# Patient Record
Sex: FEMALE | Race: WHITE | NOT HISPANIC OR LATINO | Employment: OTHER | URBAN - METROPOLITAN AREA
[De-identification: names, ages, dates, MRNs, and addresses within clinical notes are randomized per-mention and may not be internally consistent; named-entity substitution may affect disease eponyms.]

---

## 2017-10-30 ENCOUNTER — TRANSCRIBE ORDERS (OUTPATIENT)
Dept: ADMINISTRATIVE | Facility: HOSPITAL | Age: 72
End: 2017-10-30

## 2017-10-30 DIAGNOSIS — Z12.31 VISIT FOR SCREENING MAMMOGRAM: Primary | ICD-10-CM

## 2017-11-06 DIAGNOSIS — M85.80 OTHER SPECIFIED DISORDERS OF BONE DENSITY AND STRUCTURE, UNSPECIFIED SITE (CODE): ICD-10-CM

## 2017-11-06 DIAGNOSIS — Z12.31 ENCOUNTER FOR SCREENING MAMMOGRAM FOR MALIGNANT NEOPLASM OF BREAST: ICD-10-CM

## 2017-11-22 ENCOUNTER — ALLSCRIPTS OFFICE VISIT (OUTPATIENT)
Dept: OTHER | Facility: OTHER | Age: 72
End: 2017-11-22

## 2017-11-22 PROCEDURE — G0145 SCR C/V CYTO,THINLAYER,RESCR: HCPCS | Performed by: OBSTETRICS & GYNECOLOGY

## 2017-11-24 ENCOUNTER — LAB REQUISITION (OUTPATIENT)
Dept: LAB | Facility: HOSPITAL | Age: 72
End: 2017-11-24
Payer: MEDICARE

## 2017-11-24 DIAGNOSIS — Z01.419 ENCOUNTER FOR GYNECOLOGICAL EXAMINATION WITHOUT ABNORMAL FINDING: ICD-10-CM

## 2017-11-30 LAB
LAB AP GYN PRIMARY INTERPRETATION: NORMAL
Lab: NORMAL

## 2018-01-08 ENCOUNTER — HOSPITAL ENCOUNTER (OUTPATIENT)
Dept: MAMMOGRAPHY | Facility: MEDICAL CENTER | Age: 73
Discharge: HOME | End: 2018-01-08
Payer: MEDICARE

## 2018-01-08 DIAGNOSIS — Z12.31 ENCOUNTER FOR SCREENING MAMMOGRAM FOR MALIGNANT NEOPLASM OF BREAST: ICD-10-CM

## 2018-01-08 PROCEDURE — 77067 SCR MAMMO BI INCL CAD: CPT

## 2018-01-13 NOTE — MISCELLANEOUS
Message  Pt called has restarted on the premarin cream after not using for several years due to being bedridden, She wants to change to the Estrace was given a cheaper price then when she gets relief wants to try the OTC natural version which is fine with Dr Chelle Ramsey   Rx caleld in for her      Active Problems    1  Asymptomatic menopausal state (V49 81) (Z78 0)   2  Atrophic vaginitis (627 3) (N95 2)   3  Encounter for routine gynecological examination with Papanicolaou smear of cervix   (V72 31,V76 2) (Z01 419)   4  Encounter for screening mammogram for malignant neoplasm of breast (V76 12)   (Z12 31)   5  Osteopenia (733 90) (M85 80)   6  Vulvovaginitis candida albicans (112 1) (B37 3)    Current Meds   1  Calcium TABS; Therapy: (Recorded:14Mar2013) to Recorded   2  Estrace 0 1 MG/GM Vaginal Cream; 2gm vaginall once a week; Therapy: 84MEW3015 to (Last Rx:[id number])  Requested for: 37XHX0751 Ordered   3  Fish Oil CAPS; Therapy: (Recorded:08Mar2013) to Recorded   4  Flaxseed Oil CAPS; Therapy: (Recorded:14Mar2013) to Recorded   5  Glucosamine TABS; Therapy: [phone]) to Recorded   6  Losartan Potassium-HCTZ 50-12 5 MG Oral Tablet; Therapy: (Recorded:14Mar2013) to Recorded   7  Lutein 20 MG Oral Tablet; Therapy: [phone]) to Recorded   8  Magnesium 250 MG Oral Tablet; Therapy: [phone]) to Recorded   9  Premarin 0 625 MG/GM Vaginal Cream; Insert 1/2 applicator 3 x's week @ hs x 3wks  ,   then once weekly thereafter; Therapy: 60OFG6726 to (Evaluate:05Jan2016)  Requested for: 66EMM9902; Last   Rx:07Oct2015 Ordered    Allergies    1   Penicillins    Signatures   Electronically signed by : Angelica Snellen, ; May 12 2016  2:06PM EST                       (Author)

## 2018-01-22 VITALS
BODY MASS INDEX: 25.01 KG/M2 | DIASTOLIC BLOOD PRESSURE: 76 MMHG | HEIGHT: 68 IN | SYSTOLIC BLOOD PRESSURE: 136 MMHG | WEIGHT: 165 LBS

## 2018-01-26 ENCOUNTER — TELEPHONE (OUTPATIENT)
Dept: GYNECOLOGY | Facility: CLINIC | Age: 73
End: 2018-01-26

## 2018-01-29 DIAGNOSIS — N95.2 ATROPHY OF VAGINA: Primary | ICD-10-CM

## 2018-01-29 RX ORDER — ESTRADIOL 0.1 MG/G
CREAM VAGINAL
Qty: 42.5 G | Refills: 4 | Status: SHIPPED | OUTPATIENT
Start: 2018-01-29 | End: 2019-04-11 | Stop reason: SDUPTHER

## 2018-02-12 DIAGNOSIS — Z78.0 MENOPAUSE: Primary | ICD-10-CM

## 2018-03-05 ENCOUNTER — HOSPITAL ENCOUNTER (OUTPATIENT)
Dept: BONE DENSITY | Facility: MEDICAL CENTER | Age: 73
Discharge: HOME | End: 2018-03-05
Payer: MEDICARE

## 2018-03-05 DIAGNOSIS — Z78.0 MENOPAUSE: ICD-10-CM

## 2018-03-05 PROCEDURE — 77080 DXA BONE DENSITY AXIAL: CPT

## 2019-03-05 DIAGNOSIS — Z12.31 ENCOUNTER FOR SCREENING MAMMOGRAM FOR BREAST CANCER: Primary | ICD-10-CM

## 2019-03-05 DIAGNOSIS — Z12.4 SCREENING FOR MALIGNANT NEOPLASM OF CERVIX: ICD-10-CM

## 2019-03-11 ENCOUNTER — HOSPITAL ENCOUNTER (OUTPATIENT)
Dept: MAMMOGRAPHY | Facility: MEDICAL CENTER | Age: 74
Discharge: HOME | End: 2019-03-11
Payer: MEDICARE

## 2019-03-11 VITALS — HEIGHT: 68 IN | BODY MASS INDEX: 25.01 KG/M2 | WEIGHT: 165 LBS

## 2019-03-11 DIAGNOSIS — Z12.31 ENCOUNTER FOR SCREENING MAMMOGRAM FOR BREAST CANCER: ICD-10-CM

## 2019-03-11 PROCEDURE — 77063 BREAST TOMOSYNTHESIS BI: CPT

## 2019-03-11 PROCEDURE — 77067 SCR MAMMO BI INCL CAD: CPT

## 2019-04-11 DIAGNOSIS — N95.2 ATROPHY OF VAGINA: ICD-10-CM

## 2019-04-11 RX ORDER — ESTRADIOL 0.1 MG/G
CREAM VAGINAL
Qty: 42.5 G | Refills: 4 | Status: SHIPPED | OUTPATIENT
Start: 2019-04-11 | End: 2020-04-22 | Stop reason: SDUPTHER

## 2020-01-22 ENCOUNTER — APPOINTMENT (EMERGENCY)
Dept: RADIOLOGY | Facility: HOSPITAL | Age: 75
End: 2020-01-22
Payer: MEDICARE

## 2020-01-22 ENCOUNTER — APPOINTMENT (EMERGENCY)
Dept: CT IMAGING | Facility: HOSPITAL | Age: 75
End: 2020-01-22
Payer: MEDICARE

## 2020-01-22 ENCOUNTER — HOSPITAL ENCOUNTER (EMERGENCY)
Facility: HOSPITAL | Age: 75
Discharge: HOME | End: 2020-01-22
Attending: EMERGENCY MEDICINE | Admitting: EMERGENCY MEDICINE
Payer: MEDICARE

## 2020-01-22 VITALS
WEIGHT: 172 LBS | OXYGEN SATURATION: 99 % | RESPIRATION RATE: 16 BRPM | HEART RATE: 61 BPM | SYSTOLIC BLOOD PRESSURE: 139 MMHG | TEMPERATURE: 98.4 F | BODY MASS INDEX: 26.15 KG/M2 | DIASTOLIC BLOOD PRESSURE: 74 MMHG

## 2020-01-22 DIAGNOSIS — S62.101A CLOSED FRACTURE OF RIGHT WRIST, INITIAL ENCOUNTER: Primary | ICD-10-CM

## 2020-01-22 PROCEDURE — 99284 EMERGENCY DEPT VISIT MOD MDM: CPT | Performed by: PHYSICIAN ASSISTANT

## 2020-01-22 PROCEDURE — 96372 THER/PROPH/DIAG INJ SC/IM: CPT

## 2020-01-22 PROCEDURE — 73110 X-RAY EXAM OF WRIST: CPT

## 2020-01-22 PROCEDURE — 90715 TDAP VACCINE 7 YRS/> IM: CPT | Performed by: PHYSICIAN ASSISTANT

## 2020-01-22 PROCEDURE — 90471 IMMUNIZATION ADMIN: CPT

## 2020-01-22 PROCEDURE — 25605 CLTX DST RDL FX/EPHYS SEP W/: CPT | Performed by: PHYSICIAN ASSISTANT

## 2020-01-22 PROCEDURE — 72125 CT NECK SPINE W/O DYE: CPT

## 2020-01-22 PROCEDURE — 99284 EMERGENCY DEPT VISIT MOD MDM: CPT

## 2020-01-22 PROCEDURE — 70450 CT HEAD/BRAIN W/O DYE: CPT

## 2020-01-22 RX ORDER — LIDOCAINE HYDROCHLORIDE 20 MG/ML
10 INJECTION, SOLUTION EPIDURAL; INFILTRATION; INTRACAUDAL; PERINEURAL ONCE
Status: COMPLETED | OUTPATIENT
Start: 2020-01-22 | End: 2020-01-22

## 2020-01-22 RX ORDER — ACETAMINOPHEN 325 MG/1
975 TABLET ORAL ONCE AS NEEDED
Status: COMPLETED | OUTPATIENT
Start: 2020-01-22 | End: 2020-01-22

## 2020-01-22 RX ORDER — BACITRACIN, NEOMYCIN, POLYMYXIN B 400; 3.5; 5 [USP'U]/G; MG/G; [USP'U]/G
1 OINTMENT TOPICAL ONCE
Status: COMPLETED | OUTPATIENT
Start: 2020-01-22 | End: 2020-01-22

## 2020-01-22 RX ORDER — LIDOCAINE HYDROCHLORIDE 20 MG/ML
5 INJECTION, SOLUTION EPIDURAL; INFILTRATION; INTRACAUDAL; PERINEURAL ONCE
Status: COMPLETED | OUTPATIENT
Start: 2020-01-22 | End: 2020-01-22

## 2020-01-22 RX ORDER — OXYCODONE HYDROCHLORIDE AND ACETAMINOPHEN 5; 325 MG/1; MG/1
1 TABLET ORAL EVERY 4 HOURS PRN
Qty: 10 TABLET | Refills: 0 | Status: SHIPPED | OUTPATIENT
Start: 2020-01-22 | End: 2020-02-01

## 2020-01-22 RX ADMIN — LIDOCAINE HYDROCHLORIDE 4 ML: 20 INJECTION, SOLUTION EPIDURAL; INFILTRATION; INTRACAUDAL; PERINEURAL at 15:09

## 2020-01-22 RX ADMIN — MORPHINE SULFATE 2 MG: 2 INJECTION, SOLUTION INTRAMUSCULAR; INTRAVENOUS at 15:45

## 2020-01-22 RX ADMIN — LIDOCAINE HYDROCHLORIDE 10 ML: 20 INJECTION, SOLUTION EPIDURAL; INFILTRATION; INTRACAUDAL; PERINEURAL at 17:03

## 2020-01-22 RX ADMIN — TETANUS TOXOID, REDUCED DIPHTHERIA TOXOID AND ACELLULAR PERTUSSIS VACCINE, ADSORBED 0.5 ML: 5; 2.5; 8; 8; 2.5 SUSPENSION INTRAMUSCULAR at 15:46

## 2020-01-22 RX ADMIN — ACETAMINOPHEN 975 MG: 325 TABLET ORAL at 17:02

## 2020-01-22 RX ADMIN — BACITRACIN, NEOMYCIN, POLYMYXIN B 1 SMALL APPLICATION: 400; 3.5; 5 OINTMENT TOPICAL at 17:02

## 2020-01-22 RX ADMIN — MORPHINE SULFATE 2 MG: 2 INJECTION, SOLUTION INTRAMUSCULAR; INTRAVENOUS at 15:06

## 2020-01-22 NOTE — ED PROVIDER NOTES
History  Chief Complaint   Patient presents with    Wrist Injury     Patient reports she slipped on ice, falling and injuring her R wrist  Patient able to move fingers and has full sensation of the R hand  Reports she hit her head, no LOC  Denies thinners  Patient slipped and fell on the ice and landed on her right hand and wrist immediately had pain to her right wrist and came in for evaluation no medicine prior to arrival that just came right here  Patient admits she did strike the back of her head but has a large bunion on the back of her head and states that she struck this and it protected her head and she did not black out she is not having any headaches or neck pain at this time  No dizziness or pain in her chest   Feels some tingling into her thumb -but sensation in tact to light touch  Prior to Admission Medications   Prescriptions Last Dose Informant Patient Reported? Taking?   estradiol (ESTRACE VAGINAL) 0 1 mg/g vaginal cream   No No   Sig: insert 1 gram vaginally three times a week for 3 weeks then every week AS MAINTENENCE DOSE      Facility-Administered Medications: None       Past Medical History:   Diagnosis Date    Hypertension        Past Surgical History:   Procedure Laterality Date    BLADDER SUSPENSION      CATARACT EXTRACTION      FRACTURE SURGERY         Family History   Problem Relation Age of Onset    Colon cancer Father 77    Lung cancer Father 76    Breast cancer Maternal Aunt 61     I have reviewed and agree with the history as documented  Social History     Tobacco Use    Smoking status: Never Smoker   Substance Use Topics    Alcohol use: Yes     Comment: occasional     Drug use: Never        Review of Systems   All other systems reviewed and are negative  Physical Exam  Physical Exam   Constitutional: She appears well-developed and well-nourished  HENT:   Head: Normocephalic and atraumatic     Right Ear: Tympanic membrane and external ear normal  Left Ear: Tympanic membrane and external ear normal    Mouth/Throat: Oropharynx is clear and moist    Eyes: Conjunctivae and EOM are normal    Neck: Neck supple  Cardiovascular: Normal rate, regular rhythm, normal heart sounds and intact distal pulses  Pulmonary/Chest: Effort normal and breath sounds normal    Abdominal: Soft  Bowel sounds are normal    Musculoskeletal:        Right wrist: She exhibits decreased range of motion, tenderness, bony tenderness, swelling and deformity  She exhibits no laceration  Sensation in tact to light touch, good cap refill  Good pulses  Lymphadenopathy:     She has no cervical adenopathy  Neurological: She is alert  Skin: Skin is warm  No rash noted  Psychiatric: She has a normal mood and affect  Her behavior is normal    Nursing note and vitals reviewed        Vital Signs  ED Triage Vitals   Temperature Pulse Respirations Blood Pressure SpO2   01/22/20 1406 01/22/20 1403 01/22/20 1403 01/22/20 1404 01/22/20 1403   98 4 °F (36 9 °C) 62 18 (!) 171/79 99 %      Temp Source Heart Rate Source Patient Position - Orthostatic VS BP Location FiO2 (%)   01/22/20 1406 01/22/20 1403 01/22/20 1404 01/22/20 1404 --   Temporal Monitor Sitting Left arm       Pain Score       01/22/20 1403       8           Vitals:    01/22/20 1403 01/22/20 1404   BP:  (!) 171/79   Pulse: 62    Patient Position - Orthostatic VS:  Sitting         Visual Acuity      ED Medications  Medications   morphine injection 2 mg (2 mg Intramuscular Given 1/22/20 1506)   lidocaine (PF) (XYLOCAINE-MPF) 2 % injection 5 mL (4 mL Infiltration Given 1/22/20 1509)   morphine injection 2 mg (2 mg Intramuscular Given 1/22/20 1545)   tetanus-diphtheria-acellular pertussis (BOOSTRIX) IM injection 0 5 mL (0 5 mL Intramuscular Given 1/22/20 1546)   acetaminophen (TYLENOL) tablet 975 mg (975 mg Oral Given 1/22/20 1702)   neomycin-bacitracin-polymyxin b (NEOSPORIN) ointment 1 small application (1 small application Topical Given 1/22/20 1702)   lidocaine (PF) (XYLOCAINE-MPF) 2 % injection 10 mL (10 mL Infiltration Given 1/22/20 1703)       Diagnostic Studies  Results Reviewed     None                 XR wrist 3+ views RIGHT   ED Interpretation by Bekah Hurtado PA-C (01/22 3946)   Improved alighment       CT head without contrast   Final Result by Pamella Florence MD (01/22 1535)      No acute intracranial abnormality  Workstation performed: [id number]         CT cervical spine without contrast   Final Result by Pamella Florence MD (01/22 1538)      No cervical spine fracture or traumatic malalignment  Workstation performed: [id number]         XR wrist 3+ views RIGHT   ED Interpretation by Kamini Corona PA-C (01/22 2386)   Distal radius/ulna fracture      Final Result by Britton Jean MD (01/22 7689)      Comminuted, intra-articular fracture of the distal radius with apex volar angulation at the fracture site  Findings concur with the preliminary report by the referring clinician already in PACS and/or our electronic record EPIC  Workstation performed: [id number]                    Procedures  Orthopedic injury treatment  Date/Time: 1/22/2020 5:08 PM  Performed by: Kamini Corona PA-C  Authorized by: Kamini Corona PA-C     Patient Location:  ED  Procedure performed by consultant: DR Stacey Suarez      Verbal consent obtained?: Yes    Risks and benefits: Risks, benefits and alternatives were discussed    Consent given by:  Patient  Patient identity confirmed:  Verbally with patient  Injury location:  Wrist  Location details:  Right wrist  Injury type:  Fracture  Fracture type: distal radius    Fracture type: distal radius    Neurovascular status: Neurovascularly intact    Range of motion: reduced    Local anesthesia used?: Yes    Anesthesia:  Local infiltration  Local anesthetic:  Lidocaine 2% without epinephrine  Anesthetic total (ml):  10  Manipulation performed?: Yes Skin traction used: finger traps  Skeletal traction used?: Yes (finger traps)    Confirmation: Reduction confirmed by x-ray    Immobilization:  Splint  Splint type:  Sugar tong  Supplies used:  Ortho-Glass  Neurovascular status: Neurovascularly intact    Distal perfusion: normal    Neurological function: normal    Patient tolerance:  Patient tolerated the procedure well with no immediate complications             ED Course  ED Course as of Jan 22 1736 Wed Jan 22, 2020   1504 Spoke with ortho DR Alyssa Hector - requesting reduction in ER - will do hematoma block and then place in - Sugar tong to leave fingers out - hang fingers- with finger trap -   See DR Stan Aschoff tomorrow                                    MDM  Number of Diagnoses or Management Options  Closed fracture of right wrist, initial encounter: new and requires workup     Amount and/or Complexity of Data Reviewed  Tests in the radiology section of CPT®: reviewed  Independent visualization of images, tracings, or specimens: yes    Risk of Complications, Morbidity, and/or Mortality  General comments: Pt feeling much better instructions reivewed  And will FU with orhto tomorrow    Patient Progress  Patient progress: improved        Disposition  Final diagnoses:   Closed fracture of right wrist, initial encounter     Time reflects when diagnosis was documented in both MDM as applicable and the Disposition within this note     Time User Action Codes Description Comment    1/22/2020  5:02 PM Bekah Hurtado Add [S62 101A] Closed fracture of right wrist, initial encounter       ED Disposition     ED Disposition Condition Date/Time Comment    Discharge Stable Wed Jan 22, 2020  5:02 PM Ez Mcarthur discharge to home/self care              Follow-up Information     Follow up With Specialties Details Why Contact Info Additional Information    [address] [facility] Surgery In 1 day  [address] [facility] [address] [facility] [address]  [address] [place] [address], [address], [place], [place], [address] [phone]          Patient's Medications   Discharge Prescriptions    OXYCODONE-ACETAMINOPHEN (PERCOCET) 5-325 MG PER TABLET    Take 1 tablet by mouth every 4 (four) hours as needed for moderate pain for up to 10 daysMax Daily Amount: 6 tablets       Start Date: 1/22/2020 End Date: 2/1/2020       Order Dose: 1 tablet       Quantity: 10 tablet    Refills: 0     No discharge procedures on file      ED Provider  Electronically Signed by           Katerin Manrique PA-C  01/22/20 8541

## 2020-01-22 NOTE — ED NOTES
Splint and sling applied by Daniel Quiñonez, [address] [facility], RN  01/22/20 6804

## 2020-01-23 ENCOUNTER — TELEPHONE (OUTPATIENT)
Dept: OBGYN CLINIC | Facility: HOSPITAL | Age: 75
End: 2020-01-23

## 2020-01-23 ENCOUNTER — APPOINTMENT (OUTPATIENT)
Dept: RADIOLOGY | Facility: MEDICAL CENTER | Age: 75
End: 2020-01-23
Payer: MEDICARE

## 2020-01-23 ENCOUNTER — OFFICE VISIT (OUTPATIENT)
Dept: OBGYN CLINIC | Facility: MEDICAL CENTER | Age: 75
End: 2020-01-23
Payer: MEDICARE

## 2020-01-23 VITALS
HEIGHT: 68 IN | SYSTOLIC BLOOD PRESSURE: 116 MMHG | HEART RATE: 52 BPM | DIASTOLIC BLOOD PRESSURE: 70 MMHG | WEIGHT: 172 LBS | BODY MASS INDEX: 26.07 KG/M2

## 2020-01-23 DIAGNOSIS — M25.531 RIGHT WRIST PAIN: ICD-10-CM

## 2020-01-23 DIAGNOSIS — S52.571A CLOSED DIE-PUNCH INTRA-ARTICULAR FRACTURE OF DISTAL RADIUS, RIGHT, INITIAL ENCOUNTER: Primary | ICD-10-CM

## 2020-01-23 PROCEDURE — 25600 CLTX DST RDL FX/EPHYS SEP WO: CPT | Performed by: ORTHOPAEDIC SURGERY

## 2020-01-23 PROCEDURE — 73110 X-RAY EXAM OF WRIST: CPT

## 2020-01-23 PROCEDURE — 99204 OFFICE O/P NEW MOD 45 MIN: CPT | Performed by: ORTHOPAEDIC SURGERY

## 2020-01-23 NOTE — PROGRESS NOTES
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 presents with right wrist pain  She notes that she fell on ice yesterday and had immediate wrist pain  Denies any bleeding  Presented to the [facility] emergency room where reduction was performed  She was placed into a splint  Denies any numbness or tingling  No previous injury to this hand or wrist   No catching clicking popping or locking    Has been taking Percocet for pain control          Past Medical History:   Diagnosis Date    Hypertension        Past Surgical History:   Procedure Laterality Date    BLADDER SUSPENSION      CATARACT EXTRACTION      FRACTURE SURGERY         Allergies   Allergen Reactions    Penicillins Facial Swelling       Current Outpatient Medications on File Prior to Visit   Medication Sig Dispense Refill    estradiol (ESTRACE VAGINAL) 0 1 mg/g vaginal cream insert 1 gram vaginally three times a week for 3 weeks then every week AS MAINTENENCE DOSE 42 5 g 4    oxyCODONE-acetaminophen (PERCOCET) 5-325 mg per tablet Take 1 tablet by mouth every 4 (four) hours as needed for moderate pain for up to 10 daysMax Daily Amount: 6 tablets 10 tablet 0     Current Facility-Administered Medications on File Prior to Visit   Medication Dose Route Frequency Provider Last Rate Last Dose    [COMPLETED] acetaminophen (TYLENOL) tablet 975 mg  975 mg Oral Once PRN Bekah Hurtado PA-C   975 mg at 01/22/20 1702    [COMPLETED] lidocaine (PF) (XYLOCAINE-MPF) 2 % injection 10 mL  10 mL Infiltration Once Bekah Hurtado PA-C   10 mL at 01/22/20 1703    [COMPLETED] lidocaine (PF) (XYLOCAINE-MPF) 2 % injection 5 mL  5 mL Infiltration Once Bekah Hurtado PA-C   4 mL at 01/22/20 1509    [COMPLETED] morphine injection 2 mg  2 mg Intramuscular Once Bekah Hurtado PA-C   2 mg at 01/22/20 1506    [COMPLETED] morphine injection 2 mg  2 mg Intramuscular Once Bekah Hurtado PA-C   2 mg at 01/22/20 1545    [COMPLETED] neomycin-bacitracin-polymyxin b (NEOSPORIN) ointment 1 small application  1 small application Topical Once Bekah Hurtado PA-C   1 small application at 07/44/40 1702    [COMPLETED] tetanus-diphtheria-acellular pertussis (BOOSTRIX) IM injection 0 5 mL  0 5 mL Intramuscular Once Bekah Hurtado PA-C   0 5 mL at 01/22/20 1546       Social History     Tobacco Use    Smoking status: Never Smoker    Smokeless tobacco: Never Used   Substance Use Topics    Alcohol use: Yes     Comment: occasional     Drug use: Never       Family History   Problem Relation Age of Onset    Colon cancer Father 77    Lung cancer Father 76    Breast cancer Maternal Aunt 61       Review of Systems     As stated in the HPI  All other systems were reviewed and are negative  Physical Exam     /70   Pulse (!) 52   Ht 5' 8" (1 727 m)   Wt 78 kg (172 lb)   BMI 26 15 kg/m²     GENERAL: This is a well-developed, well-nourished, age-appropriate patient in no acute distress  The patient is alert and oriented x3  Pleasant and cooperative  Eyes: Anicteric sclerae  Extraocular movements appear intact  HENT: Nares are patent with no drainage  Lungs: There is equal chest rise on inspection  Breathing is non-labored with no audible wheezing  Cardiovascular: No cyanosis  No upper extremity lymphadema  Skin: Skin is warm to touch  No obvious skin lesions or rashes other than described below  Neurologic: No ataxia  Psychiatric: Mood and affect are appropriate  Examination of the right upper extremity reveals a splint that is clean dry and intact  This is sugar-tong in nature and extended with Ace wrap to an appropriate level to allow for finger flexion which she has full flexion and full extension  Sensation intact to light touch in the median radial ulnar nerve distribution    Brisk capillary refill to all digits  Splint is well fitting    Data Review     Results Reviewed     None             Imaging: Three-view imaging from an outside facility and then repeat imaging today in the splint taken in the office and reviewed by me today shows a right wrist with an intra-articular distal radius fracture in stable alignment and well reduced    Assessment and Plan      Diagnoses and all orders for this visit:    Closed die-punch intra-articular fracture of distal radius, right, initial encounter  -     XR wrist 3+ vw right; Future         16-year-old female with a well reduced right intra-articular distal radius fracture  I discussed with her that intra-articular distal radius fractures in 16-year-old women have a tendency for displacement given bone quality and deforming forces  However, we can attempt to close management of this fracture by obtaining weekly x-rays to evaluate for interval displacement  If she has maintaining her alignment, we can continue with closed management  This would involve transition to a cast likely in a week or 2 weeks  Total casting time would be about 6 weeks and then should be transition to a removable wrist brace  Discussed that surgical intervention would involve plates and screws and that she would likely be moving more quickly, but her reduction is quite good and so this would not likely be necessary  Patient would like to pursue nonoperative care and I have agreed that this would be a good option  We will see her back in 1 week for repeat clinical evaluation  Discussed finger range of motion exercises and nonweightbearing  Follow Up:  1 week    To Do Next Visit:  Three-view x-rays right wrist in splint    PROCEDURES PERFORMED:  Fracture / Dislocation Treatment  Date/Time: 1/23/2020 2:37 PM  Performed by: Wendy Patricia MD  Authorized by:  Wendy Patricia MD     Injury location:  Wrist  Location details:  Right wrist  Injury type:  Fracture  Fracture type: distal radius    Fracture type: distal radius    Manipulation performed?: No

## 2020-01-23 NOTE — TELEPHONE ENCOUNTER
Patient was referred to Dr Yenny Campoverde to be seen today by Dr Marianne Mcneill    I placed this patient on the schedule for 1pm

## 2020-01-27 ENCOUNTER — TELEPHONE (OUTPATIENT)
Dept: OBGYN CLINIC | Facility: HOSPITAL | Age: 75
End: 2020-01-27

## 2020-01-27 NOTE — TELEPHONE ENCOUNTER
Patient states that yesterday she went to make a fist and gently helped close her right hand into a fist and now the pinky finger feels badly sprained  Patient states also the middle finger is numb  She is wearing the splint that was given to her at the ED  I placed her in the same day slot for tomorrow, however she wants to be seen today      Magen QUINTANILLAO#[phone]

## 2020-01-28 ENCOUNTER — OFFICE VISIT (OUTPATIENT)
Dept: OBGYN CLINIC | Facility: MEDICAL CENTER | Age: 75
End: 2020-01-28
Payer: MEDICARE

## 2020-01-28 ENCOUNTER — APPOINTMENT (OUTPATIENT)
Dept: RADIOLOGY | Facility: MEDICAL CENTER | Age: 75
End: 2020-01-28
Payer: MEDICARE

## 2020-01-28 VITALS
BODY MASS INDEX: 26.37 KG/M2 | DIASTOLIC BLOOD PRESSURE: 81 MMHG | HEART RATE: 58 BPM | WEIGHT: 174 LBS | HEIGHT: 68 IN | SYSTOLIC BLOOD PRESSURE: 155 MMHG

## 2020-01-28 DIAGNOSIS — S52.571D CLOSED DIE-PUNCH INTRA-ARTICULAR FRACTURE OF DISTAL RADIUS, RIGHT, WITH ROUTINE HEALING, SUBSEQUENT ENCOUNTER: Primary | ICD-10-CM

## 2020-01-28 DIAGNOSIS — M25.531 RIGHT WRIST PAIN: ICD-10-CM

## 2020-01-28 PROCEDURE — 73110 X-RAY EXAM OF WRIST: CPT

## 2020-01-28 PROCEDURE — 99024 POSTOP FOLLOW-UP VISIT: CPT | Performed by: ORTHOPAEDIC SURGERY

## 2020-01-28 PROCEDURE — 29075 APPL CST ELBW FNGR SHORT ARM: CPT | Performed by: ORTHOPAEDIC SURGERY

## 2020-01-28 NOTE — PROGRESS NOTES
History of the Present Illness     Lizy Sherwood is a 75 y o  female presents the office today for follow-up evaluation of her right intra-articular distal radius fracture sustained 01/22/2020  Patient has been immobilized in a sugar-tong splint since her last visit  Patient notes increased pain as well as numbness and tingling to her ring and small finger  She notes no new incident of injury  She has been taking Percocet for pain relief  Physical Exam     /81   Pulse 58   Ht 5' 8" (1 727 m)   Wt 78 9 kg (174 lb)   BMI 26 46 kg/m²     Right wrist  Splint intact  Able to make full composite fist  Sensation intact to light touch in the median, radial, and ulnar nerve distribution  Data Review       Imaging:  X-rays of the right wrist obtained today were personally reviewed in the office by me and demonstrate well-aligned intra-articular distal radius fracture in stable alignment in comparison to previous x-rays  Assessment and Plan     54-year-old female with right distal radius intra-articular fracture sustained on 01/22/2020  X-rays demonstrate acceptable alignment of fracture which has stayed stable position since her last visit  Due to this we will transition her to a short-arm cast today  I will see her back in 1 weeks time for new x-rays in the cast   As long as the fracture continues to stay in stable alignment surgical intervention will not be warranted  Follow Up:  1 week    To Do Next Visit:  New x-rays in cast right wrist three-view    PROCEDURES PERFORMED:  Cast application  Date/Time: 1/28/2020 11:51 AM  Performed by: Jcaob Cook MD  Authorized by:  Jacob Cook MD     Consent:     Consent given by:  Patient  Pre-procedure details:     Sensation:  Normal  Procedure details:     Laterality:  Right    Location:  Wrist    Wrist:  R wristCast type:  Short arm    Supplies:  Cotton padding and fiberglass

## 2020-01-28 NOTE — PROGRESS NOTES
Chief Complaint     ***      History of Present Illness     Hodges Hodgkins Orysia Shawl is a 75 y o  female ***          Past Medical History:   Diagnosis Date    Hypertension        Past Surgical History:   Procedure Laterality Date    BLADDER SUSPENSION      CATARACT EXTRACTION      FRACTURE SURGERY         Allergies   Allergen Reactions    Penicillins Facial Swelling       Current Outpatient Medications on File Prior to Visit   Medication Sig Dispense Refill    estradiol (ESTRACE VAGINAL) 0 1 mg/g vaginal cream insert 1 gram vaginally three times a week for 3 weeks then every week AS MAINTENENCE DOSE 42 5 g 4    oxyCODONE-acetaminophen (PERCOCET) 5-325 mg per tablet Take 1 tablet by mouth every 4 (four) hours as needed for moderate pain for up to 10 daysMax Daily Amount: 6 tablets 10 tablet 0     No current facility-administered medications on file prior to visit  Social History     Tobacco Use    Smoking status: Never Smoker    Smokeless tobacco: Never Used   Substance Use Topics    Alcohol use: Yes     Comment: occasional     Drug use: Never       Family History   Problem Relation Age of Onset    Colon cancer Father 77    Lung cancer Father 76    Breast cancer Maternal Aunt 61       Review of Systems     As stated in the HPI  All other systems were reviewed and are negative  Physical Exam     /81   Pulse 58   Ht 5' 8" (1 727 m)   Wt 78 9 kg (174 lb)   BMI 26 46 kg/m²     GENERAL: This is a well-developed, well-nourished, age-appropriate patient in no acute distress  The patient is alert and oriented x3  Pleasant and cooperative  Eyes: Anicteric sclerae  Extraocular movements appear intact  HENT: Nares are patent with no drainage  Lungs: There is equal chest rise on inspection  Breathing is non-labored with no audible wheezing  Cardiovascular: No cyanosis  No upper extremity lymphadema  Skin: Skin is warm to touch   No obvious skin lesions or rashes other than described below   Neurologic: No ataxia  Psychiatric: Mood and affect are appropriate      ***    Data Review     Results Reviewed     None             Imaging:  ***    Assessment and Plan      Diagnoses and all orders for this visit:    Right wrist pain  -     XR wrist 3+ vw right; Future             ***      Follow Up: ***    To Do Next Visit: ***    PROCEDURES PERFORMED:  Procedures  {Was [facility] done:[id number]::"No Procedures performed today"}

## 2020-02-03 ENCOUNTER — TELEPHONE (OUTPATIENT)
Dept: OBGYN CLINIC | Facility: OTHER | Age: 75
End: 2020-02-03

## 2020-02-06 NOTE — TELEPHONE ENCOUNTER
If she can see one of my partners to assess fracture reduction, I can help guide a change to treatment if needed  Can she be seen today or tomorrow with 3-views wrist xray?

## 2020-02-07 ENCOUNTER — OFFICE VISIT (OUTPATIENT)
Dept: OBGYN CLINIC | Facility: MEDICAL CENTER | Age: 75
End: 2020-02-07

## 2020-02-07 ENCOUNTER — APPOINTMENT (OUTPATIENT)
Dept: RADIOLOGY | Facility: MEDICAL CENTER | Age: 75
End: 2020-02-07
Payer: MEDICARE

## 2020-02-07 VITALS — WEIGHT: 174 LBS | HEIGHT: 68 IN | BODY MASS INDEX: 26.37 KG/M2

## 2020-02-07 DIAGNOSIS — S52.571D CLOSED DIE-PUNCH INTRA-ARTICULAR FRACTURE OF DISTAL RADIUS, RIGHT, WITH ROUTINE HEALING, SUBSEQUENT ENCOUNTER: Primary | ICD-10-CM

## 2020-02-07 DIAGNOSIS — S52.571D CLOSED DIE-PUNCH INTRA-ARTICULAR FRACTURE OF DISTAL RADIUS, RIGHT, WITH ROUTINE HEALING, SUBSEQUENT ENCOUNTER: ICD-10-CM

## 2020-02-07 PROCEDURE — 73110 X-RAY EXAM OF WRIST: CPT

## 2020-02-07 PROCEDURE — 99024 POSTOP FOLLOW-UP VISIT: CPT | Performed by: ORTHOPAEDIC SURGERY

## 2020-02-07 RX ORDER — CLINDAMYCIN HYDROCHLORIDE 300 MG/1
CAPSULE ORAL
COMMUNITY
Start: 2019-06-07 | End: 2021-11-29

## 2020-02-07 RX ORDER — LOSARTAN POTASSIUM AND HYDROCHLOROTHIAZIDE 12.5; 5 MG/1; MG/1
TABLET ORAL
COMMUNITY
Start: 2019-12-03 | End: 2020-02-07 | Stop reason: SDUPTHER

## 2020-02-07 RX ORDER — LUTEIN 6 MG
20 TABLET ORAL DAILY
COMMUNITY

## 2020-02-07 RX ORDER — NAPROXEN SODIUM 220 MG
220 TABLET ORAL EVERY 12 HOURS PRN
COMMUNITY

## 2020-02-07 RX ORDER — LOSARTAN POTASSIUM AND HYDROCHLOROTHIAZIDE 12.5; 5 MG/1; MG/1
TABLET ORAL
COMMUNITY
Start: 2019-12-03

## 2020-02-07 RX ORDER — CALCIUM CARBONATE 260MG(650)
200 TABLET,CHEWABLE ORAL
COMMUNITY
Start: 2015-02-16

## 2020-02-07 NOTE — PROGRESS NOTES
Orthopaedic Surgery - Office Note  Shea Og (63 y o  female)   : 1945   MRN: [id number]  Encounter Date: 2020    Chief Complaint   Patient presents with    Right Wrist - Pain       Assessment / Plan  Right closed die-punch intra-articular fracture of distal radius     · Short arm cast  · Home exercise program reviewed  Return for follow up with Dr Damon Gardner 2020  History of Present Illness  Tad Pyle is a 75 y o  female who presents for follow-up evaluation right closed intra-articular fracture of the distal radius  She presents the office in a short arm cast   She is complaining of increased finger swelling the a, decreased hand strength and a mild constant "bruise feeling" on the guerrero aspect of her right hand  She reports that she is unable to  anything more than a piece of paper  She reports she has been compliant with the exercises provided to her by Dr Daomn Gardner at her last appointment  She denies any further injury or trauma to her right hand  She denies any distal paresthesias  Review of Systems  Pertinent items are noted in HPI  All other systems were reviewed and are negative  Physical Exam  Ht 5' 8" (1 727 m)   Wt 78 9 kg (174 lb)   BMI 26 46 kg/m²   Cons: Appears well  No apparent distress  Psych: Alert  Oriented x3  Mood and affect normal   Eyes: PERRLA, EOMI  Resp: Normal effort  No audible wheezing or stridor  CV: Palpable pulse  No discernable arrhythmia  No LE edema  Lymph:  No palpable cervical, axillary, or inguinal lymphadenopathy  Skin: Warm  No palpable masses  No visible lesions  Neuro: Normal muscle tone  Normal and symmetric DTR's  Right Hand & Wrist Exam  Alignment:  Normal elbow alignment and carrying angle  Normal resting hand posture  Inspection:  moderate finger swelling  No erythema  No ecchymosis  Palpation:  No tenderness  ROM:  Full extension of all fingers    Strength:  Able to actively flex, extend, abduct, & adduct fingers  Stability:  Not tested  Tests:  No pertinent positive or negative tests  Neurovascular:  Sensation intact in Ax/R/M/U nerve distributions  2+ radial pulse  Studies Reviewed  XR of right forearm - Obtained on February 7, 2020 demonstrated good alignment of closed distal radius fracture  Due to [facility] system being down I was unable to compare alignment to prior imaging  Procedures  No procedures today  Medical, Surgical, Family, and Social History  The patient's medical history, family history, and social history, were reviewed and updated as appropriate      Past Medical History:   Diagnosis Date    Hypertension        Past Surgical History:   Procedure Laterality Date    BLADDER SUSPENSION      CATARACT EXTRACTION      FRACTURE SURGERY         Family History   Problem Relation Age of Onset    Colon cancer Father 77    Lung cancer Father 76    Breast cancer Maternal Aunt 61       Social History     Occupational History    Not on file   Tobacco Use    Smoking status: Never Smoker    Smokeless tobacco: Never Used   Substance and Sexual Activity    Alcohol use: Yes     Comment: occasional     Drug use: Never    Sexual activity: Not on file       Allergies   Allergen Reactions    Penicillins Facial Swelling    Pollen Extract Itching and Other (See Comments)         Current Outpatient Medications:     clindamycin (CLEOCIN) 300 MG capsule, take 2 capsules by mouth every 8 hours AFTER DENTAL PROCEDURE, Disp: , Rfl:     estradiol (ESTRACE VAGINAL) 0 1 mg/g vaginal cream, insert 1 gram vaginally three times a week for 3 weeks then every week AS MAINTENENCE DOSE, Disp: 42 5 g, Rfl: 4    losartan-hydrochlorothiazide (HYZAAR) 50-12 5 mg per tablet, , Disp: , Rfl:     Lutein 20 MG TABS, Take 20 mg by mouth daily, Disp: , Rfl:     Magnesium Citrate 100 MG TABS, Take 200 mg by mouth, Disp: , Rfl:     naproxen sodium (ALEVE) 220 MG tablet, Take 220 mg by mouth every 12 (twelve) hours as needed, Disp: , Rfl:     Omega-3 Fatty Acids (FISH OIL) 645 MG CAPS, Take by mouth, Disp: , Rfl:       Oumou Doll    Scribe Attestation    I,:   Denzel Hilliard am acting as a scribe while in the presence of the attending physician :        I,:   Senia Coffman MD personally performed the services described in this documentation    as scribed in my presence :

## 2020-02-14 ENCOUNTER — OFFICE VISIT (OUTPATIENT)
Dept: OBGYN CLINIC | Facility: MEDICAL CENTER | Age: 75
End: 2020-02-14

## 2020-02-14 VITALS
SYSTOLIC BLOOD PRESSURE: 123 MMHG | HEIGHT: 68 IN | HEART RATE: 63 BPM | BODY MASS INDEX: 26.22 KG/M2 | WEIGHT: 173 LBS | DIASTOLIC BLOOD PRESSURE: 76 MMHG

## 2020-02-14 DIAGNOSIS — S52.571D CLOSED DIE-PUNCH INTRA-ARTICULAR FRACTURE OF DISTAL RADIUS, RIGHT, WITH ROUTINE HEALING, SUBSEQUENT ENCOUNTER: Primary | ICD-10-CM

## 2020-02-14 PROCEDURE — 99024 POSTOP FOLLOW-UP VISIT: CPT | Performed by: ORTHOPAEDIC SURGERY

## 2020-02-14 NOTE — PATIENT INSTRUCTIONS
Cast and Splint Care    Splints and casts are supports that are used to protect injured bones and soft tissues  A cast completely encircles the limb with a hard, rigid outer shell  A splint provides rigid support along just the side(s) of the limb, with soft or open areas in between  Splints are often used in the immediate post-operative or injury phase, when there is a greater chance of swelling  A splint can better allow for swelling  Your doctor will decide which type of support is most appropriate for you and your arm condition  Materials  Casts are made with plaster or 'fiberglass' to form the hard supportive layer, and a soft lining of cotton or similar material for padding  Fiberglass is lighter, more durable, and breathes better than plaster  Plaster is less expensive and shapes better than fiberglass for some uses  Both materials come in strips and rolls, and are dipped in water to start the setting process  Most casts have a layer of padding underneath the hard material  X-rays can be taken through casts, but they do block some of the x-ray detail  Splints can be made with these same materials or with plastic, fabric, or padded aluminum  They can be custom-made or pre-made  They come in a variety of shapes and sizes to meet specific needs  They often have Velcro straps, which makes them easier to take on and off  For fiberglass casts, water-compatible padding does exist; ask your doctor if they have it available  Not all injuries can be treated with a waterproof cast  Waterproof casts can be submerged  It is best to avoid water from lakes, rivers and oceans when wearing a waterproof cast because your skin can become irritated if dirt or sand gets inside the cast  When you come in contact with chlorinated water or dirty water, rinse the cast with fresh water when you are done swimming  Allow the inside of the cast to drain as much as possible after it gets wet   If you have a cast that goes past your elbow, be sure to drain the area around the elbow well  The rest of the water will evaporate  Sweat will not harm the cast liner, but consider rinsing the cast with clean water after excessive sweating  Swelling  Swelling due to your injury or surgery is usually the worst during the first 2 or 3 days  Swelling can cause pressure in your splint or cast, making it feel tighter  To help avoid or reduce swelling, you should put your hand and arm above your heart by propping it up on pillows or some other support if possible  Elevation helps gravity to drain the blood and fluid that causes swelling out of your hand and arm  Elevation can also decrease pain  If swelling increases too much, a cast or splint can become too tight  The following signs and symptoms should be watched for and, if they occur, you should contact your doctor promptly for advice:   worsening pain   numbness and tingling in your hand or fingers, which may indicate excessive pressure on the nerves   burning and stinging, which may result from too much pressure on the skin   excessive swelling of the hand, which may mean the veins are being blocked   loss of active movement of your fingers, which may indicate muscle damage    Sometimes a cast may need to be changed if the cast is too tight or if it gets too loose when the swelling goes down  Cast and Splint Care  Keep your cast/splint clean and dry  Being in contact with damp padding can irritate your skin  Plaster gets softer and weaker when it gets wet  Use plastic bags or a waterproof cast cover to keep your splint or cast dry when bathing  Seal the bag with tape or rubber bands  Elevate your hand in the shower above your head, because otherwise water can still run under the seal and into your cast  Do not keep it constantly covered because moisture may build up from normal sweating   Do not let dirt, sand, or other materials get inside of your splint or cast  If you feel itchy, do not place anything inside your cast because you can injure your skin; instead, ask your doctor for advice  Never trim the cast or splint by yourself  If there are rough edges or if your skin gets irritated around the edges of the cast, notify your doctor, who has the proper tools to fix it  If the cast or splint develops cracks or soft spots, contact your doctor to see if it needs to be repaired or changed  Cast Removal  Never try to remove a cast yourself; you may cut your skin or prevent proper healing of your injury  A cast should be removed only by a professional with the proper tools and training  Casts are removed with a special type of saw that will not cut your skin  Remember, a cast is there to protect you while your injury heals  It is only a temporary inconvenience, with the goal of helping you recover  © 2012 [employer]  www handcare  org

## 2020-02-14 NOTE — PROGRESS NOTES
Chief Complaint     Right wrist fracture       History of Present Illness     Joselin Lua is a 75 y o  female who presents as a follow up for a right closed die punch intra articular fracture of distal radius fracture sustained on 1/22/2020  Patient presents in a short arm cast today  Patient states that she notices swelling into her fingers of her right hand  She also notices occasional mild achy sharp pains to her right small finger however this resolves  She denies numbness and tingling  Physical Exam     /76   Pulse 63   Ht 5' 8" (1 727 m)   Wt 78 5 kg (173 lb)   BMI 26 30 kg/m²     GENERAL: This is a well-developed, well-nourished, age-appropriate patient in no acute distress  The patient is alert and oriented x3  Pleasant and cooperative  Eyes: Anicteric sclerae  Extraocular movements appear intact  HENT: Nares are patent with no drainage  Lungs: There is equal chest rise on inspection  Breathing is non-labored with no audible wheezing  Cardiovascular: No cyanosis  No upper extremity lymphadema  Skin: Skin is warm to touch  No obvious skin lesions or rashes other than described below  Neurologic: No ataxia  Psychiatric: Mood and affect are appropriate  RIGHT: well fitted cast today, NVI, near full composite fist due to stiffness  Data Review     Results Reviewed     None         Imaging:  No images to review today  Assessment and Plan      Diagnoses and all orders for this visit:    Closed die-punch intra-articular fracture of distal radius, right, with routine healing, subsequent encounter           It was discussed with the patient that she should continue elevation and continue to apply ice and take Tylenol as needed for pain  The importance of regaining finger range of motion was discussed with the patient today    We will see her back in 1 week for new x-rays in cast         Follow Up: 1 week    To Do Next Visit: xray of right wrist 3 vw,     PROCEDURES PERFORMED:  Procedures  No Procedures performed today     Scribe Attestation    I,:   Tammi Sarmiento am acting as a scribe while in the presence of the attending physician :        I,:   Piyush Pickering MD personally performed the services described in this documentation    as scribed in my presence :

## 2020-02-20 ENCOUNTER — APPOINTMENT (OUTPATIENT)
Dept: RADIOLOGY | Facility: MEDICAL CENTER | Age: 75
End: 2020-02-20
Payer: MEDICARE

## 2020-02-20 ENCOUNTER — OFFICE VISIT (OUTPATIENT)
Dept: OBGYN CLINIC | Facility: MEDICAL CENTER | Age: 75
End: 2020-02-20

## 2020-02-20 VITALS
SYSTOLIC BLOOD PRESSURE: 128 MMHG | HEART RATE: 54 BPM | WEIGHT: 170 LBS | HEIGHT: 68 IN | BODY MASS INDEX: 25.76 KG/M2 | DIASTOLIC BLOOD PRESSURE: 78 MMHG

## 2020-02-20 DIAGNOSIS — S52.571D CLOSED DIE-PUNCH INTRA-ARTICULAR FRACTURE OF DISTAL RADIUS, RIGHT, WITH ROUTINE HEALING, SUBSEQUENT ENCOUNTER: ICD-10-CM

## 2020-02-20 DIAGNOSIS — S52.571D CLOSED DIE-PUNCH INTRA-ARTICULAR FRACTURE OF DISTAL RADIUS, RIGHT, WITH ROUTINE HEALING, SUBSEQUENT ENCOUNTER: Primary | ICD-10-CM

## 2020-02-20 PROCEDURE — 73110 X-RAY EXAM OF WRIST: CPT

## 2020-02-20 PROCEDURE — 99024 POSTOP FOLLOW-UP VISIT: CPT | Performed by: ORTHOPAEDIC SURGERY

## 2020-02-20 NOTE — PROGRESS NOTES
History of the Present Illness     Suman Franklin is a 75 y o  female  status post closed management right distal radius fracture  Patient notes that her pain is significantly improving  Able to lift up pressure at this point  Working aggressively on finger motion but does not feel like she has all the way there  No numbness or tingling  Physical Exam     /78   Pulse (!) 54   Ht 5' 8" (1 727 m)   Wt 77 1 kg (170 lb)   BMI 25 85 kg/m²     Examination right upper extremity reveals a cast that is slightly loose but mostly intact and not shifting  She has full digital flexion allow will by the cast   No significant edema at the fingers  5/5 Motor to the APB, FDI, FDP2, FDP5, EDC  Sensation intact to light touch in the median, radial, and ulnar nerve distribution  Brisk capillary refill    Data Review       Imaging:  A three-view imaging of the right wrist taken in the office and personally reviewed by me today shows evidence of stable alignment of the intra-articular distal radius fracture with a bit of ulnar positive variance    Assessment and Plan       41-year-old female with a healing right intra-articular distal radius fracture  I counseled her that things are still looking good and that her digital motion is improving so we will continue cast treatment at this point    We will see her back in 2 weeks for repeat evaluation with x-rays out of cast      Follow Up:  2 weeks    To Do Next Visit:  Three-view x-rays right wrist out of cast    PROCEDURES PERFORMED:  Procedures  No Procedures performed today

## 2020-03-05 ENCOUNTER — OFFICE VISIT (OUTPATIENT)
Dept: OBGYN CLINIC | Facility: MEDICAL CENTER | Age: 75
End: 2020-03-05

## 2020-03-05 ENCOUNTER — APPOINTMENT (OUTPATIENT)
Dept: RADIOLOGY | Facility: MEDICAL CENTER | Age: 75
End: 2020-03-05
Payer: MEDICARE

## 2020-03-05 VITALS
SYSTOLIC BLOOD PRESSURE: 101 MMHG | HEART RATE: 68 BPM | WEIGHT: 167 LBS | HEIGHT: 68 IN | BODY MASS INDEX: 25.31 KG/M2 | DIASTOLIC BLOOD PRESSURE: 64 MMHG

## 2020-03-05 DIAGNOSIS — S52.571D CLOSED DIE-PUNCH INTRA-ARTICULAR FRACTURE OF DISTAL RADIUS, RIGHT, WITH ROUTINE HEALING, SUBSEQUENT ENCOUNTER: Primary | ICD-10-CM

## 2020-03-05 DIAGNOSIS — S52.571D CLOSED DIE-PUNCH INTRA-ARTICULAR FRACTURE OF DISTAL RADIUS, RIGHT, WITH ROUTINE HEALING, SUBSEQUENT ENCOUNTER: ICD-10-CM

## 2020-03-05 PROCEDURE — 73110 X-RAY EXAM OF WRIST: CPT

## 2020-03-05 PROCEDURE — 99024 POSTOP FOLLOW-UP VISIT: CPT | Performed by: ORTHOPAEDIC SURGERY

## 2020-03-05 NOTE — PATIENT INSTRUCTIONS
Passive supination of wrist holding to tolerance for 10-12 seconds with opposite hand 10 reps for 3 sets   Passive wrist flexion and extension 10-12 seconds again holding with opposite hand 10 reps for 3 sets   Passive flexion of all fingers 10-12 seconds again with opposite hand 10 reps for 3 sets

## 2020-03-05 NOTE — PROGRESS NOTES
Chief Complaint     Right distal radius fracture       History of Present Illness     Sana Casas is a 75 y o  female  with a healing right intra-articular distal radius fracture 1/22/20  She presents in short arm cast which is intact  Pain is well controlled  She has been working on finger motion in cast     Denies numbness or tingling in fingers  Physical Exam     /64   Pulse 68   Ht 5' 8" (1 727 m)   Wt 75 8 kg (167 lb)   BMI 25 39 kg/m²     GENERAL: This is a well-developed, well-nourished, age-appropriate patient in no acute distress  The patient is alert and oriented x3  Pleasant and cooperative  Eyes: Anicteric sclerae  Extraocular movements appear intact  HENT: Nares are patent with no drainage  Lungs: There is equal chest rise on inspection  Breathing is non-labored with no audible wheezing  Cardiovascular: No cyanosis  No upper extremity lymphadema  Skin: Skin is warm to touch  No obvious skin lesions or rashes other than described below  Neurologic: No ataxia  Psychiatric: Mood and affect are appropriate  Right wrist:  No erythema, no ecchymosis, no swelling  Non tender over distal radius and ulna  Supination to neutral, pronation full  DPC 3 cm   5/5 Motor to the APB, FDI, FDP2, FDP5, EDC  Sensation intact to light touch in the median, radial, and ulnar nerve distribution  Data Review     Results Reviewed     None             Imaging:  Xray of right wrist personally reviewed by me demonstrate stable alignment of intra articular distal radius with continued healing, mild ulnar positive variance unchanged from previous imaging  Assessment and Plan      Diagnoses and all orders for this visit:    Closed die-punch intra-articular fracture of distal radius, right, with routine healing, subsequent encounter  -     XR wrist 3+ vw right; Future  -     Cock Up Wrist Splint           S/p healing right intra-articular distal radius fracture 1/22/20   Cast removed today, imaging reviewed with patient showing stable fixation and healing of distal radius  Will place in cock up wrist splint, can remove to shower  Shown HEP in office, supination, wrist flexion and extension and flexion of fingers in guerrero aspect of hand making fist  If she needs script for formal therapy call       Follow Up: 4 weeks     To Do Next Visit:     PROCEDURES PERFORMED:  Procedures  No Procedures performed today         Scribe Attestation    I,:   Manuel Peters am acting as a scribe while in the presence of the attending physician :        I,:   Lucy Belcher MD personally performed the services described in this documentation    as scribed in my presence :

## 2020-03-09 ENCOUNTER — TRANSCRIBE ORDERS (OUTPATIENT)
Dept: ADMINISTRATIVE | Facility: HOSPITAL | Age: 75
End: 2020-03-09

## 2020-03-09 ENCOUNTER — APPOINTMENT (OUTPATIENT)
Dept: LAB | Facility: MEDICAL CENTER | Age: 75
End: 2020-03-09
Payer: MEDICARE

## 2020-03-09 DIAGNOSIS — E78.2 MIXED HYPERLIPIDEMIA: ICD-10-CM

## 2020-03-09 DIAGNOSIS — I10 ESSENTIAL HYPERTENSION, MALIGNANT: ICD-10-CM

## 2020-03-09 DIAGNOSIS — E55.9 VITAMIN D DEFICIENCY: ICD-10-CM

## 2020-03-09 DIAGNOSIS — E78.2 MIXED HYPERLIPIDEMIA: Primary | ICD-10-CM

## 2020-03-09 LAB
25(OH)D3 SERPL-MCNC: 41.8 NG/ML (ref 30–100)
ALBUMIN SERPL BCP-MCNC: 3.6 G/DL (ref 3.5–5)
ALP SERPL-CCNC: 101 U/L (ref 46–116)
ALT SERPL W P-5'-P-CCNC: 20 U/L (ref 12–78)
ANION GAP SERPL CALCULATED.3IONS-SCNC: 4 MMOL/L (ref 4–13)
AST SERPL W P-5'-P-CCNC: 19 U/L (ref 5–45)
BACTERIA UR QL AUTO: ABNORMAL /HPF
BASOPHILS # BLD AUTO: 0.03 THOUSANDS/ΜL (ref 0–0.1)
BASOPHILS NFR BLD AUTO: 1 % (ref 0–1)
BILIRUB SERPL-MCNC: 0.27 MG/DL (ref 0.2–1)
BILIRUB UR QL STRIP: NEGATIVE
BUN SERPL-MCNC: 28 MG/DL (ref 5–25)
CALCIUM SERPL-MCNC: 9.6 MG/DL (ref 8.3–10.1)
CHLORIDE SERPL-SCNC: 108 MMOL/L (ref 100–108)
CHOLEST SERPL-MCNC: 238 MG/DL (ref 50–200)
CLARITY UR: ABNORMAL
CO2 SERPL-SCNC: 31 MMOL/L (ref 21–32)
COLOR UR: YELLOW
CREAT SERPL-MCNC: 0.8 MG/DL (ref 0.6–1.3)
EOSINOPHIL # BLD AUTO: 0.17 THOUSAND/ΜL (ref 0–0.61)
EOSINOPHIL NFR BLD AUTO: 4 % (ref 0–6)
ERYTHROCYTE [DISTWIDTH] IN BLOOD BY AUTOMATED COUNT: 14 % (ref 11.6–15.1)
GFR SERPL CREATININE-BSD FRML MDRD: 72 ML/MIN/1.73SQ M
GLUCOSE P FAST SERPL-MCNC: 83 MG/DL (ref 65–99)
GLUCOSE UR STRIP-MCNC: NEGATIVE MG/DL
HCT VFR BLD AUTO: 41 % (ref 34.8–46.1)
HDLC SERPL-MCNC: 82 MG/DL
HGB BLD-MCNC: 13.1 G/DL (ref 11.5–15.4)
HGB UR QL STRIP.AUTO: NEGATIVE
IMM GRANULOCYTES # BLD AUTO: 0.01 THOUSAND/UL (ref 0–0.2)
IMM GRANULOCYTES NFR BLD AUTO: 0 % (ref 0–2)
KETONES UR STRIP-MCNC: NEGATIVE MG/DL
LDLC SERPL CALC-MCNC: 143 MG/DL (ref 0–100)
LEUKOCYTE ESTERASE UR QL STRIP: NEGATIVE
LYMPHOCYTES # BLD AUTO: 1.33 THOUSANDS/ΜL (ref 0.6–4.47)
LYMPHOCYTES NFR BLD AUTO: 30 % (ref 14–44)
MCH RBC QN AUTO: 30.8 PG (ref 26.8–34.3)
MCHC RBC AUTO-ENTMCNC: 32 G/DL (ref 31.4–37.4)
MCV RBC AUTO: 97 FL (ref 82–98)
MONOCYTES # BLD AUTO: 0.49 THOUSAND/ΜL (ref 0.17–1.22)
MONOCYTES NFR BLD AUTO: 11 % (ref 4–12)
NEUTROPHILS # BLD AUTO: 2.38 THOUSANDS/ΜL (ref 1.85–7.62)
NEUTS SEG NFR BLD AUTO: 54 % (ref 43–75)
NITRITE UR QL STRIP: NEGATIVE
NON-SQ EPI CELLS URNS QL MICRO: ABNORMAL /HPF
NONHDLC SERPL-MCNC: 156 MG/DL
NRBC BLD AUTO-RTO: 0 /100 WBCS
PH UR STRIP.AUTO: 5.5 [PH]
PLATELET # BLD AUTO: 264 THOUSANDS/UL (ref 149–390)
PMV BLD AUTO: 10.5 FL (ref 8.9–12.7)
POTASSIUM SERPL-SCNC: 4.5 MMOL/L (ref 3.5–5.3)
PROT SERPL-MCNC: 7.3 G/DL (ref 6.4–8.2)
PROT UR STRIP-MCNC: NEGATIVE MG/DL
RBC # BLD AUTO: 4.25 MILLION/UL (ref 3.81–5.12)
RBC #/AREA URNS AUTO: ABNORMAL /HPF
SODIUM SERPL-SCNC: 143 MMOL/L (ref 136–145)
SP GR UR STRIP.AUTO: 1.02 (ref 1–1.03)
TRIGL SERPL-MCNC: 66 MG/DL
TSH SERPL DL<=0.05 MIU/L-ACNC: 2.64 UIU/ML (ref 0.36–3.74)
UROBILINOGEN UR QL STRIP.AUTO: 0.2 E.U./DL
WBC # BLD AUTO: 4.41 THOUSAND/UL (ref 4.31–10.16)
WBC #/AREA URNS AUTO: ABNORMAL /HPF

## 2020-03-09 PROCEDURE — 36415 COLL VENOUS BLD VENIPUNCTURE: CPT

## 2020-03-09 PROCEDURE — 84443 ASSAY THYROID STIM HORMONE: CPT

## 2020-03-09 PROCEDURE — 80053 COMPREHEN METABOLIC PANEL: CPT

## 2020-03-09 PROCEDURE — 80061 LIPID PANEL: CPT

## 2020-03-09 PROCEDURE — 85025 COMPLETE CBC W/AUTO DIFF WBC: CPT

## 2020-03-09 PROCEDURE — 82306 VITAMIN D 25 HYDROXY: CPT

## 2020-03-09 PROCEDURE — 81001 URINALYSIS AUTO W/SCOPE: CPT | Performed by: PHYSICIAN ASSISTANT

## 2020-03-10 ENCOUNTER — TRANSCRIBE ORDERS (OUTPATIENT)
Dept: ADMINISTRATIVE | Facility: HOSPITAL | Age: 75
End: 2020-03-10

## 2020-03-10 DIAGNOSIS — Z12.31 ENCOUNTER FOR SCREENING MAMMOGRAM FOR BREAST CANCER: Primary | ICD-10-CM

## 2020-03-10 DIAGNOSIS — Z78.0 POST-MENOPAUSE: ICD-10-CM

## 2020-04-21 ENCOUNTER — TELEPHONE (OUTPATIENT)
Dept: GYNECOLOGY | Facility: CLINIC | Age: 75
End: 2020-04-21

## 2020-04-22 DIAGNOSIS — N95.2 ATROPHY OF VAGINA: ICD-10-CM

## 2020-04-22 RX ORDER — ESTRADIOL 0.1 MG/G
CREAM VAGINAL
Qty: 42.5 G | Refills: 6 | Status: SHIPPED | OUTPATIENT
Start: 2020-04-22 | End: 2020-04-22 | Stop reason: SDUPTHER

## 2020-04-22 RX ORDER — ESTRADIOL 0.1 MG/G
CREAM VAGINAL
Qty: 42.5 G | Refills: 6 | Status: SHIPPED | OUTPATIENT
Start: 2020-04-22 | End: 2020-07-02 | Stop reason: SDUPTHER

## 2020-06-19 ENCOUNTER — HOSPITAL ENCOUNTER (OUTPATIENT)
Dept: MAMMOGRAPHY | Facility: MEDICAL CENTER | Age: 75
Discharge: HOME | End: 2020-06-19
Payer: MEDICARE

## 2020-06-19 ENCOUNTER — HOSPITAL ENCOUNTER (OUTPATIENT)
Dept: BONE DENSITY | Facility: MEDICAL CENTER | Age: 75
Discharge: HOME | End: 2020-06-19
Payer: MEDICARE

## 2020-06-19 VITALS — HEIGHT: 68 IN | WEIGHT: 162 LBS | BODY MASS INDEX: 24.55 KG/M2

## 2020-06-19 DIAGNOSIS — Z78.0 POST-MENOPAUSE: ICD-10-CM

## 2020-06-19 DIAGNOSIS — Z12.31 ENCOUNTER FOR SCREENING MAMMOGRAM FOR BREAST CANCER: ICD-10-CM

## 2020-06-19 PROCEDURE — 77063 BREAST TOMOSYNTHESIS BI: CPT

## 2020-06-19 PROCEDURE — 77067 SCR MAMMO BI INCL CAD: CPT

## 2020-06-19 PROCEDURE — 77080 DXA BONE DENSITY AXIAL: CPT

## 2020-07-02 ENCOUNTER — ANNUAL EXAM (OUTPATIENT)
Dept: GYNECOLOGY | Facility: CLINIC | Age: 75
End: 2020-07-02
Payer: MEDICARE

## 2020-07-02 VITALS
HEART RATE: 69 BPM | DIASTOLIC BLOOD PRESSURE: 58 MMHG | BODY MASS INDEX: 24.63 KG/M2 | HEIGHT: 68 IN | SYSTOLIC BLOOD PRESSURE: 96 MMHG

## 2020-07-02 DIAGNOSIS — N39.3 SUI (STRESS URINARY INCONTINENCE, FEMALE): ICD-10-CM

## 2020-07-02 DIAGNOSIS — Z01.419 ENCOUNTER FOR GYNECOLOGICAL EXAMINATION WITHOUT ABNORMAL FINDING: Primary | ICD-10-CM

## 2020-07-02 DIAGNOSIS — Z12.4 ENCOUNTER FOR PAPANICOLAOU SMEAR FOR CERVICAL CANCER SCREENING: ICD-10-CM

## 2020-07-02 DIAGNOSIS — N95.2 ATROPHY OF VAGINA: ICD-10-CM

## 2020-07-02 DIAGNOSIS — N95.2 ATROPHIC VAGINITIS: ICD-10-CM

## 2020-07-02 PROCEDURE — G0101 CA SCREEN;PELVIC/BREAST EXAM: HCPCS | Performed by: OBSTETRICS & GYNECOLOGY

## 2020-07-02 PROCEDURE — G0145 SCR C/V CYTO,THINLAYER,RESCR: HCPCS | Performed by: OBSTETRICS & GYNECOLOGY

## 2020-07-02 RX ORDER — ESTRADIOL 0.1 MG/G
CREAM VAGINAL
Qty: 42.5 G | Refills: 6 | Status: SHIPPED | OUTPATIENT
Start: 2020-07-02 | End: 2021-09-08

## 2020-07-02 NOTE — PROGRESS NOTES
Assessment/Plan:         Diagnoses and all orders for this visit:    Encounter for gynecological examination without abnormal finding    Atrophic vaginitis    MONET (stress urinary incontinence, female); recommended urodynamics to rule out ISD  At this point the patient has opted to follow  Atrophy of vagina  -     estradiol (ESTRACE VAGINAL) 0 1 mg/g vaginal cream; insert 1 gram vaginally twice a week AS MAINTENENCE DOSE        Subjective:      Patient ID: Kayden Davila is a 67 y o  female  HPI   patient presents to the office for gyn exam   She is complaining of incontinence of urine this can occur with laughing, sneezing coughing minimal activity  She also has noticed occasional episodes of spontaneous loss of urine  She denies any urgency or urge incontinence  She is presently on Estrace cream for vaginal atrophy  She denies any dysuria, hematuria or fever  Denies any abdominal pain  DEXA scan June 2020--normal    The following portions of the patient's history were reviewed and updated as appropriate:   She  has a past medical history of Hypertension  She There are no active problems to display for this patient  She  has a past surgical history that includes Fracture surgery; Cataract extraction; and Bladder suspension  Her family history includes Breast cancer (age of onset: 61) in her maternal aunt; Colon cancer (age of onset: 77) in her father; Lung cancer (age of onset: 76) in her father; No Known Problems in her daughter, daughter, maternal grandfather, maternal grandmother, mother, paternal grandfather, paternal grandmother, and sister  She  reports that she has never smoked  She has never used smokeless tobacco  She reports that she drinks alcohol  She reports that she does not use drugs    Current Outpatient Medications   Medication Sig Dispense Refill    estradiol (ESTRACE VAGINAL) 0 1 mg/g vaginal cream insert 1 gram vaginally twice a week AS MAINTENENCE DOSE 42 5 g 6    losartan-hydrochlorothiazide (HYZAAR) 50-12 5 mg per tablet       Lutein 20 MG TABS Take 20 mg by mouth daily      Magnesium Citrate 100 MG TABS Take 200 mg by mouth      Omega-3 Fatty Acids (FISH OIL) 645 MG CAPS Take by mouth      clindamycin (CLEOCIN) 300 MG capsule take 2 capsules by mouth every 8 hours AFTER DENTAL PROCEDURE      naproxen sodium (ALEVE) 220 MG tablet Take 220 mg by mouth every 12 (twelve) hours as needed       No current facility-administered medications for this visit  Current Outpatient Medications on File Prior to Visit   Medication Sig    losartan-hydrochlorothiazide (HYZAAR) 50-12 5 mg per tablet     Lutein 20 MG TABS Take 20 mg by mouth daily    Magnesium Citrate 100 MG TABS Take 200 mg by mouth    Omega-3 Fatty Acids (FISH OIL) 645 MG CAPS Take by mouth    [DISCONTINUED] estradiol (ESTRACE VAGINAL) 0 1 mg/g vaginal cream insert 1 gram vaginally twice a week AS MAINTENENCE DOSE    clindamycin (CLEOCIN) 300 MG capsule take 2 capsules by mouth every 8 hours AFTER DENTAL PROCEDURE    naproxen sodium (ALEVE) 220 MG tablet Take 220 mg by mouth every 12 (twelve) hours as needed     No current facility-administered medications on file prior to visit  She is allergic to penicillins and pollen extract       Review of Systems   Constitutional: Negative  HENT: Negative for sore throat and trouble swallowing  Gastrointestinal: Negative  Genitourinary: Positive for enuresis  Negative for difficulty urinating, dyspareunia, dysuria, frequency, genital sores, hematuria, pelvic pain, urgency, vaginal bleeding and vaginal pain  Objective:      BP 96/58   Pulse 69          Physical Exam   Constitutional: She appears well-developed and well-nourished  Neck: Normal range of motion  Neck supple  No thyromegaly present  Cardiovascular: Normal rate, regular rhythm and normal heart sounds     Pulmonary/Chest: Effort normal and breath sounds normal  No respiratory distress  Right breast exhibits no inverted nipple, no mass, no nipple discharge, no skin change and no tenderness  Left breast exhibits no inverted nipple, no mass, no nipple discharge, no skin change and no tenderness  Abdominal: Soft  Bowel sounds are normal  She exhibits no distension and no mass  There is no tenderness  There is no rebound and no guarding  No hernia  Genitourinary: There is no rash, tenderness or lesion on the right labia  There is no rash, tenderness or lesion on the left labia  Genitourinary Comments: Uterus anteverted normal size and contour and freely mobile nontender  There are no palpable adnexal masses or tenderness  Cervix appears normal   Vagina with atrophic changes  Urethral orifice normal   No cystocele or rectocele  Bartholin's and [place]'s glands normal   External genitalia   Lymphadenopathy:     She has no cervical adenopathy

## 2020-07-08 LAB
LAB AP GYN PRIMARY INTERPRETATION: NORMAL
Lab: NORMAL

## 2020-07-20 ENCOUNTER — TRANSCRIBE ORDERS (OUTPATIENT)
Dept: LAB | Facility: MEDICAL CENTER | Age: 75
End: 2020-07-20

## 2020-07-20 ENCOUNTER — APPOINTMENT (OUTPATIENT)
Dept: LAB | Facility: MEDICAL CENTER | Age: 75
End: 2020-07-20
Payer: MEDICARE

## 2020-07-20 DIAGNOSIS — E04.2 NONTOXIC MULTINODULAR GOITER: Primary | ICD-10-CM

## 2020-07-20 DIAGNOSIS — E04.2 NONTOXIC MULTINODULAR GOITER: ICD-10-CM

## 2020-07-20 LAB — TSH SERPL DL<=0.05 MIU/L-ACNC: 2.48 UIU/ML (ref 0.36–3.74)

## 2020-07-20 PROCEDURE — 84443 ASSAY THYROID STIM HORMONE: CPT

## 2020-07-20 PROCEDURE — 36415 COLL VENOUS BLD VENIPUNCTURE: CPT

## 2021-01-20 ENCOUNTER — LAB (OUTPATIENT)
Dept: LAB | Facility: MEDICAL CENTER | Age: 76
End: 2021-01-20
Payer: MEDICARE

## 2021-01-20 ENCOUNTER — TRANSCRIBE ORDERS (OUTPATIENT)
Dept: ADMINISTRATIVE | Facility: HOSPITAL | Age: 76
End: 2021-01-20

## 2021-01-20 DIAGNOSIS — E55.9 AVITAMINOSIS D: ICD-10-CM

## 2021-01-20 DIAGNOSIS — Z23 ENCOUNTER FOR IMMUNIZATION: ICD-10-CM

## 2021-01-20 DIAGNOSIS — I10 HYPERTENSION, UNSPECIFIED TYPE: ICD-10-CM

## 2021-01-20 DIAGNOSIS — E78.2 MIXED HYPERLIPIDEMIA: ICD-10-CM

## 2021-01-20 DIAGNOSIS — E78.2 MIXED HYPERLIPIDEMIA: Primary | ICD-10-CM

## 2021-01-20 LAB
25(OH)D3 SERPL-MCNC: 35.2 NG/ML (ref 30–100)
ALBUMIN SERPL BCP-MCNC: 4 G/DL (ref 3.5–5)
ALP SERPL-CCNC: 87 U/L (ref 46–116)
ALT SERPL W P-5'-P-CCNC: 26 U/L (ref 12–78)
ANION GAP SERPL CALCULATED.3IONS-SCNC: 3 MMOL/L (ref 4–13)
AST SERPL W P-5'-P-CCNC: 26 U/L (ref 5–45)
BASOPHILS # BLD AUTO: 0.03 THOUSANDS/ΜL (ref 0–0.1)
BASOPHILS NFR BLD AUTO: 1 % (ref 0–1)
BILIRUB SERPL-MCNC: 0.56 MG/DL (ref 0.2–1)
BUN SERPL-MCNC: 24 MG/DL (ref 5–25)
CALCIUM SERPL-MCNC: 10.3 MG/DL (ref 8.3–10.1)
CHLORIDE SERPL-SCNC: 106 MMOL/L (ref 100–108)
CHOLEST SERPL-MCNC: 248 MG/DL (ref 50–200)
CO2 SERPL-SCNC: 33 MMOL/L (ref 21–32)
CREAT SERPL-MCNC: 0.96 MG/DL (ref 0.6–1.3)
EOSINOPHIL # BLD AUTO: 0.16 THOUSAND/ΜL (ref 0–0.61)
EOSINOPHIL NFR BLD AUTO: 4 % (ref 0–6)
ERYTHROCYTE [DISTWIDTH] IN BLOOD BY AUTOMATED COUNT: 13.9 % (ref 11.6–15.1)
GFR SERPL CREATININE-BSD FRML MDRD: 58 ML/MIN/1.73SQ M
GLUCOSE P FAST SERPL-MCNC: 85 MG/DL (ref 65–99)
HCT VFR BLD AUTO: 43.4 % (ref 34.8–46.1)
HDLC SERPL-MCNC: 107 MG/DL
HGB BLD-MCNC: 14.2 G/DL (ref 11.5–15.4)
IMM GRANULOCYTES # BLD AUTO: 0 THOUSAND/UL (ref 0–0.2)
IMM GRANULOCYTES NFR BLD AUTO: 0 % (ref 0–2)
LDLC SERPL CALC-MCNC: 130 MG/DL (ref 0–100)
LYMPHOCYTES # BLD AUTO: 1.25 THOUSANDS/ΜL (ref 0.6–4.47)
LYMPHOCYTES NFR BLD AUTO: 32 % (ref 14–44)
MCH RBC QN AUTO: 30.3 PG (ref 26.8–34.3)
MCHC RBC AUTO-ENTMCNC: 32.7 G/DL (ref 31.4–37.4)
MCV RBC AUTO: 93 FL (ref 82–98)
MONOCYTES # BLD AUTO: 0.44 THOUSAND/ΜL (ref 0.17–1.22)
MONOCYTES NFR BLD AUTO: 11 % (ref 4–12)
NEUTROPHILS # BLD AUTO: 2.05 THOUSANDS/ΜL (ref 1.85–7.62)
NEUTS SEG NFR BLD AUTO: 52 % (ref 43–75)
NONHDLC SERPL-MCNC: 141 MG/DL
NRBC BLD AUTO-RTO: 0 /100 WBCS
PLATELET # BLD AUTO: 284 THOUSANDS/UL (ref 149–390)
PMV BLD AUTO: 10.8 FL (ref 8.9–12.7)
POTASSIUM SERPL-SCNC: 3.9 MMOL/L (ref 3.5–5.3)
PROT SERPL-MCNC: 7.7 G/DL (ref 6.4–8.2)
RBC # BLD AUTO: 4.68 MILLION/UL (ref 3.81–5.12)
SODIUM SERPL-SCNC: 142 MMOL/L (ref 136–145)
TRIGL SERPL-MCNC: 54 MG/DL
WBC # BLD AUTO: 3.93 THOUSAND/UL (ref 4.31–10.16)

## 2021-01-20 PROCEDURE — 36415 COLL VENOUS BLD VENIPUNCTURE: CPT

## 2021-01-20 PROCEDURE — 80061 LIPID PANEL: CPT

## 2021-01-20 PROCEDURE — 82306 VITAMIN D 25 HYDROXY: CPT

## 2021-01-20 PROCEDURE — 85025 COMPLETE CBC W/AUTO DIFF WBC: CPT

## 2021-01-20 PROCEDURE — 80053 COMPREHEN METABOLIC PANEL: CPT

## 2021-01-21 ENCOUNTER — OFFICE VISIT (OUTPATIENT)
Dept: OBGYN CLINIC | Facility: MEDICAL CENTER | Age: 76
End: 2021-01-21
Payer: MEDICARE

## 2021-01-21 VITALS
DIASTOLIC BLOOD PRESSURE: 74 MMHG | HEIGHT: 68 IN | BODY MASS INDEX: 25.31 KG/M2 | HEART RATE: 54 BPM | SYSTOLIC BLOOD PRESSURE: 128 MMHG | WEIGHT: 167 LBS

## 2021-01-21 DIAGNOSIS — S52.571D CLOSED DIE-PUNCH INTRA-ARTICULAR FRACTURE OF DISTAL RADIUS, RIGHT, WITH ROUTINE HEALING, SUBSEQUENT ENCOUNTER: Primary | ICD-10-CM

## 2021-01-21 PROCEDURE — 99213 OFFICE O/P EST LOW 20 MIN: CPT | Performed by: ORTHOPAEDIC SURGERY

## 2021-01-21 NOTE — PROGRESS NOTES
Chief Complaint     Right distal radius fracture      History of Present Illness     Kimo Talavera is a 67 y o  female who presents today for follow up evaluation of right intra-articular distal radius fracture from 01/22/20  She was last seen in the beginning of March at which time her cast was removed  She did not follow up after this secondary to [place]  She states overall she is doing well  Feels like the hand is slightly weak, but states she can lift 20lbs with this hand  She does get occasional soreness and has noticed some swelling towards the ulnar aspect of her wrist/distal forearm  States this is more so when she is doing repetitive lifting of boxes  Denies numbness/tingling  Past Medical History:   Diagnosis Date    Hypertension        Past Surgical History:   Procedure Laterality Date    BLADDER SUSPENSION      CATARACT EXTRACTION      FRACTURE SURGERY         Allergies   Allergen Reactions    Penicillins Facial Swelling    Pollen Extract Itching and Other (See Comments)       Current Outpatient Medications on File Prior to Visit   Medication Sig Dispense Refill    clindamycin (CLEOCIN) 300 MG capsule take 2 capsules by mouth every 8 hours AFTER DENTAL PROCEDURE      estradiol (ESTRACE VAGINAL) 0 1 mg/g vaginal cream insert 1 gram vaginally twice a week AS MAINTENENCE DOSE 42 5 g 6    losartan-hydrochlorothiazide (HYZAAR) 50-12 5 mg per tablet       Magnesium Citrate 100 MG TABS Take 200 mg by mouth      naproxen sodium (ALEVE) 220 MG tablet Take 220 mg by mouth every 12 (twelve) hours as needed      Omega-3 Fatty Acids (FISH OIL) 645 MG CAPS Take by mouth      Lutein 20 MG TABS Take 20 mg by mouth daily       No current facility-administered medications on file prior to visit          Social History     Tobacco Use    Smoking status: Never Smoker    Smokeless tobacco: Never Used   Substance Use Topics    Alcohol use: Yes     Frequency: 2-4 times a month     Comment: occasional  Drug use: Never       Family History   Problem Relation Age of Onset    Colon cancer Father 77    Lung cancer Father 76    Breast cancer Maternal Aunt 61    No Known Problems Mother     No Known Problems Sister     No Known Problems Daughter     No Known Problems Maternal Grandmother     No Known Problems Maternal Grandfather     No Known Problems Paternal Grandmother     No Known Problems Paternal Grandfather     No Known Problems Daughter        Review of Systems     As stated in the HPI  All other systems were reviewed and are negative  Physical Exam     /74   Pulse (!) 54   Ht 5' 8" (1 727 m)   Wt 75 8 kg (167 lb)   BMI 25 39 kg/m²     GENERAL: This is a well-developed, well-nourished, age-appropriate patient in no acute distress  The patient is alert and oriented x3  Pleasant and cooperative  Eyes: Anicteric sclerae  Extraocular movements appear intact  HENT: Nares are patent with no drainage  Lungs: There is equal chest rise on inspection  Breathing is non-labored with no audible wheezing  Cardiovascular: No cyanosis  No upper extremity lymphadema  Skin: Skin is warm to touch  No obvious skin lesions or rashes other than described below  Neurologic: No ataxia  Psychiatric: Mood and affect are appropriate  Right Upper Extremity:  Denies tenderness to palpation distal radius and ulnar styloid   Denies tenderness to palpation of dorsal extensor compartments  No tenderness pisotriquetral joint  Mild foveal tenderness  Slightly more motion to shuck of the ulna compared to contralateral  Negative impaction signs  Patient has FROM with forearm pronation/supination  Patient has comparable ROM to contralateral side with flexion/extension  DPC 0  5/5 Motor to the APB, FDI, FDP2, FDP5, EDC  Sensation intact to light touch in the median, radial, and ulnar nerve distribution        Data Review     Results Reviewed     None             Imaging:  No new images today     Assessment and Plan      Diagnoses and all orders for this visit:    Closed die-punch intra-articular fracture of distal radius, right, with routine healing, subsequent encounter       67year old female who presents today for follow up evaluation of right intra-articular distal radius fracture from 01/22/20  Patient overall is doing very well  At this point, patient can continue to use her wrist brace as need  It was discussed that patient does not exhibit any concerning findings on exam  Patient is a very active person and it was explained that the mild tenderness she has can occur with increased activity   If she continues to have issues, give us a call and we can consider further treatment    Follow Up: PRN    PROCEDURES PERFORMED:  Procedures  No Procedures performed today

## 2021-04-14 ENCOUNTER — TRANSCRIBE ORDERS (OUTPATIENT)
Dept: ADMINISTRATIVE | Facility: HOSPITAL | Age: 76
End: 2021-04-14

## 2021-04-14 ENCOUNTER — APPOINTMENT (OUTPATIENT)
Dept: LAB | Facility: MEDICAL CENTER | Age: 76
End: 2021-04-14
Payer: MEDICARE

## 2021-04-14 DIAGNOSIS — E83.52 HYPERCALCEMIA: Primary | ICD-10-CM

## 2021-04-14 DIAGNOSIS — E83.52 HYPERCALCEMIA: ICD-10-CM

## 2021-04-14 LAB
ALBUMIN SERPL BCP-MCNC: 3.7 G/DL (ref 3.5–5)
ALP SERPL-CCNC: 92 U/L (ref 46–116)
ALT SERPL W P-5'-P-CCNC: 29 U/L (ref 12–78)
ANION GAP SERPL CALCULATED.3IONS-SCNC: 2 MMOL/L (ref 4–13)
AST SERPL W P-5'-P-CCNC: 25 U/L (ref 5–45)
BILIRUB SERPL-MCNC: 0.61 MG/DL (ref 0.2–1)
BUN SERPL-MCNC: 21 MG/DL (ref 5–25)
CALCIUM SERPL-MCNC: 9.5 MG/DL (ref 8.3–10.1)
CHLORIDE SERPL-SCNC: 107 MMOL/L (ref 100–108)
CO2 SERPL-SCNC: 32 MMOL/L (ref 21–32)
CREAT SERPL-MCNC: 0.86 MG/DL (ref 0.6–1.3)
GFR SERPL CREATININE-BSD FRML MDRD: 65 ML/MIN/1.73SQ M
GLUCOSE SERPL-MCNC: 96 MG/DL (ref 65–140)
POTASSIUM SERPL-SCNC: 4.2 MMOL/L (ref 3.5–5.3)
PROT SERPL-MCNC: 7.6 G/DL (ref 6.4–8.2)
SODIUM SERPL-SCNC: 141 MMOL/L (ref 136–145)

## 2021-04-14 PROCEDURE — 80053 COMPREHEN METABOLIC PANEL: CPT

## 2021-04-14 PROCEDURE — 36415 COLL VENOUS BLD VENIPUNCTURE: CPT

## 2021-09-08 DIAGNOSIS — N95.2 ATROPHY OF VAGINA: ICD-10-CM

## 2021-09-08 RX ORDER — ESTRADIOL 0.1 MG/G
CREAM VAGINAL
Qty: 42.5 G | Refills: 5 | Status: SHIPPED | OUTPATIENT
Start: 2021-09-08

## 2021-11-23 ENCOUNTER — HOSPITAL ENCOUNTER (OUTPATIENT)
Dept: MAMMOGRAPHY | Facility: MEDICAL CENTER | Age: 76
Discharge: HOME | End: 2021-11-23
Payer: MEDICARE

## 2021-11-23 VITALS — BODY MASS INDEX: 26.22 KG/M2 | HEIGHT: 68 IN | WEIGHT: 173 LBS

## 2021-11-23 DIAGNOSIS — Z12.31 SCREENING MAMMOGRAM, ENCOUNTER FOR: ICD-10-CM

## 2021-11-23 PROCEDURE — 77063 BREAST TOMOSYNTHESIS BI: CPT

## 2021-11-23 PROCEDURE — 77067 SCR MAMMO BI INCL CAD: CPT

## 2021-11-29 ENCOUNTER — OFFICE VISIT (OUTPATIENT)
Dept: URGENT CARE | Facility: MEDICAL CENTER | Age: 76
End: 2021-11-29
Payer: MEDICARE

## 2021-11-29 VITALS
WEIGHT: 173 LBS | SYSTOLIC BLOOD PRESSURE: 140 MMHG | HEART RATE: 80 BPM | HEIGHT: 68 IN | TEMPERATURE: 97.2 F | RESPIRATION RATE: 16 BRPM | DIASTOLIC BLOOD PRESSURE: 82 MMHG | OXYGEN SATURATION: 98 % | BODY MASS INDEX: 26.22 KG/M2

## 2021-11-29 DIAGNOSIS — B34.9 ACUTE VIRAL SYNDROME: Primary | ICD-10-CM

## 2021-11-29 PROCEDURE — 0241U HB NFCT DS VIR RESP RNA 4 TRGT: CPT | Performed by: PHYSICIAN ASSISTANT

## 2021-11-29 PROCEDURE — G0463 HOSPITAL OUTPT CLINIC VISIT: HCPCS | Performed by: PHYSICIAN ASSISTANT

## 2021-11-29 PROCEDURE — 99213 OFFICE O/P EST LOW 20 MIN: CPT | Performed by: PHYSICIAN ASSISTANT

## 2021-11-29 RX ORDER — LOSARTAN POTASSIUM AND HYDROCHLOROTHIAZIDE 12.5; 5 MG/1; MG/1
TABLET ORAL
COMMUNITY

## 2021-11-29 RX ORDER — LUTEIN 6 MG
TABLET ORAL
COMMUNITY

## 2021-11-30 LAB
FLUAV RNA RESP QL NAA+PROBE: NEGATIVE
FLUBV RNA RESP QL NAA+PROBE: NEGATIVE
RSV RNA RESP QL NAA+PROBE: NEGATIVE
SARS-COV-2 RNA RESP QL NAA+PROBE: POSITIVE

## 2021-12-22 ENCOUNTER — TELEPHONE (OUTPATIENT)
Dept: GYNECOLOGY | Facility: CLINIC | Age: 76
End: 2021-12-22

## 2022-01-04 NOTE — PROGRESS NOTES
AMB US Pelvic Non OB    Date/Time: 1/5/2022 12:13 PM  Performed by: Hetal Dumont  Authorized by: Sanchez Garcia DO   [place] Protocol:  Patient identity confirmed: verbally with patient      Procedure details:     Indications: non-obstetric vaginal bleeding      Technique:  Transvaginal US, Non-OB    Position: lithotomy exam    Uterine findings:     Length (cm): 7 38    Height (cm):  4 32    Width (cm):  5 59    Endometrial stripe: identified      Endometrium thickness (mm):  4 3  Left ovary findings:     Left ovary:  Visualized    Length (cm): 1 68    Height (cm): 1 06    Width (cm): 1 94  Right ovary findings:     Right ovary:  Visualized    Length (cm): 1 96    Height (cm): 1 78    Width (cm): 0 96  Other findings:     Free pelvic fluid: not identified      Free peritoneal fluid: not identified    Post-Procedure Details:     Impression:  Retroverted uterus demonstrates multiple fibroids with calcifications  Largest are right subserosal 4 2, right lower uterine segment subserosal 2 7cm, and left subserosal 1 9cm  The bilateral ovaries appear within normal limits  No free fluid  Multiple nabothian cysts are noted in the cervix  Tolerance: Tolerated well, no immediate complications    Complications: no complications    Additional Procedure Comments:      Gidsyiq P5 transvaginal transducer E8C with Serial Number [id number] was used during procedure and subsequently cleaned with high level disinfection utilizing the Grow the [place] [employer]       Ultrasound performed at:     [facility] [address]  [address], [address]  Phone: [phone]  Fax:  [phone]    Endometrial biopsy    Date/Time: 1/5/2022 12:15 PM  Performed by: Hetal Dumont  Authorized by: Sanchez Garcia DO   [place] Protocol:  Patient identity confirmed: verbally with patient      Procedure:     Procedure: endometrial biopsy with Pipelle      A bivalve speculum was placed in the vagina: yes      Unable to perform due to: cervical stenosis    Comments:     Procedure comments:  Endometrial biopsy was attempted and subsequently discontinued due to cervical stenosis and patient discomfort

## 2022-01-05 ENCOUNTER — OFFICE VISIT (OUTPATIENT)
Dept: GYNECOLOGY | Facility: CLINIC | Age: 77
End: 2022-01-05
Payer: MEDICARE

## 2022-01-05 ENCOUNTER — ULTRASOUND (OUTPATIENT)
Dept: GYNECOLOGY | Facility: CLINIC | Age: 77
End: 2022-01-05
Payer: MEDICARE

## 2022-01-05 DIAGNOSIS — D21.9 FIBROIDS: Primary | ICD-10-CM

## 2022-01-05 DIAGNOSIS — N95.0 PMB (POSTMENOPAUSAL BLEEDING): Primary | ICD-10-CM

## 2022-01-05 DIAGNOSIS — N95.0 PMB (POSTMENOPAUSAL BLEEDING): ICD-10-CM

## 2022-01-05 PROCEDURE — 99212 OFFICE O/P EST SF 10 MIN: CPT | Performed by: OBSTETRICS & GYNECOLOGY

## 2022-01-05 PROCEDURE — 76830 TRANSVAGINAL US NON-OB: CPT | Performed by: OBSTETRICS & GYNECOLOGY

## 2022-01-05 NOTE — PROGRESS NOTES
Patient called the office on December 22nd complaining of scant amount of dark red bleeding  She was advised to return to the office for transvaginal scan possible biopsy possible saline infusion hysterosonography    AMB US Pelvic Non OB     Date/Time: 1/5/2022 12:13 PM  Performed by: María Elena Min  Authorized by: John Boyce DO   [place] Protocol:  Patient identity confirmed: verbally with patient        Procedure details:     Indications: non-obstetric vaginal bleeding      Technique:  Transvaginal US, Non-OB    Position: lithotomy exam    Uterine findings:     Length (cm): 7 38    Height (cm):  4 32    Width (cm):  5 59    Endometrial stripe: identified      Endometrium thickness (mm):  4 3  Left ovary findings:     Left ovary:  Visualized    Length (cm): 1 68    Height (cm): 1 06    Width (cm): 1 94  Right ovary findings:     Right ovary:  Visualized    Length (cm): 1 96    Height (cm): 1 78    Width (cm): 0 96  Other findings:     Free pelvic fluid: not identified      Free peritoneal fluid: not identified    Post-Procedure Details:     Impression:  Retroverted uterus demonstrates multiple fibroids with calcifications  Largest are right subserosal 4 2, right lower uterine segment subserosal 2 7cm, and left subserosal 1 9cm  The bilateral ovaries appear within normal limits  No free fluid  Multiple nabothian cysts are noted in the cervix  Tolerance:   Tolerated well, no immediate complications    Complications: no complications    Additional Procedure Comments:      Surfkitchen Logiq P5 transvaginal transducer E8C with Serial Number [id number] was used during procedure and subsequently cleaned with high level disinfection utilizing the [place] [place]       Ultrasound performed at:   [address]   Phone: [phone]  Fax:  [phone]     Endometrial biopsy     Date/Time: 1/5/2022 12:15 PM  Performed by: Tai Patel  Authorized by: Nati Burden DO   [place] Protocol:  Patient identity confirmed: verbally with patient        Procedure:     Procedure: endometrial biopsy with Pipelle      A bivalve speculum was placed in the vagina: yes      Unable to perform due to: cervical stenosis    Comments:     Procedure comments:  Endometrial biopsy was attempted and subsequently discontinued due to cervical stenosis and patient discomfort  Impression: Postmenopausal bleeding  I suspect this is related to atrophy  Plan: Patient aware that endometrial biopsy was unsuccessful due to stenotic cervix and discomfort with attempted dilation  Since the lining is borderline my recommendation was that if she has no further bleeding no further workup needs to be performed    Should she have any further bleeding especially bright red she will need to be scheduled for hysteroscopy D&C

## 2022-03-25 ENCOUNTER — DOCUMENTATION (OUTPATIENT)
Dept: GYNECOLOGY | Facility: CLINIC | Age: 77
End: 2022-03-25

## 2022-03-25 NOTE — PROGRESS NOTES
PT CALLED SHE WAS CHARGED FOR EMB   THAT WAS NOT DONE   CONTACTED BILLING THEY WILL ADJUST  CHARGE FOR HER

## 2022-07-25 DIAGNOSIS — Z12.31 ENCOUNTER FOR SCREENING MAMMOGRAM FOR BREAST CANCER: Primary | ICD-10-CM

## 2022-07-25 DIAGNOSIS — Z78.0 MENOPAUSE: ICD-10-CM

## 2022-08-18 ENCOUNTER — APPOINTMENT (OUTPATIENT)
Dept: LAB | Facility: MEDICAL CENTER | Age: 77
End: 2022-08-18
Payer: MEDICARE

## 2022-08-18 ENCOUNTER — ANNUAL EXAM (OUTPATIENT)
Dept: GYNECOLOGY | Facility: CLINIC | Age: 77
End: 2022-08-18
Payer: MEDICARE

## 2022-08-18 VITALS — DIASTOLIC BLOOD PRESSURE: 78 MMHG | HEART RATE: 55 BPM | SYSTOLIC BLOOD PRESSURE: 112 MMHG

## 2022-08-18 DIAGNOSIS — E04.2 MULTIPLE THYROID NODULES: ICD-10-CM

## 2022-08-18 DIAGNOSIS — Z13.820 SCREENING FOR OSTEOPOROSIS: ICD-10-CM

## 2022-08-18 DIAGNOSIS — N95.2 ATROPHIC VAGINITIS: ICD-10-CM

## 2022-08-18 DIAGNOSIS — Z01.419 ENCOUNTER FOR GYNECOLOGICAL EXAMINATION WITHOUT ABNORMAL FINDING: Primary | ICD-10-CM

## 2022-08-18 LAB — TSH SERPL DL<=0.05 MIU/L-ACNC: 1.29 UIU/ML (ref 0.45–4.5)

## 2022-08-18 PROCEDURE — 36415 COLL VENOUS BLD VENIPUNCTURE: CPT

## 2022-08-18 PROCEDURE — G0145 SCR C/V CYTO,THINLAYER,RESCR: HCPCS | Performed by: OBSTETRICS & GYNECOLOGY

## 2022-08-18 PROCEDURE — 84443 ASSAY THYROID STIM HORMONE: CPT

## 2022-08-18 PROCEDURE — G0101 CA SCREEN;PELVIC/BREAST EXAM: HCPCS | Performed by: OBSTETRICS & GYNECOLOGY

## 2022-08-18 NOTE — PROGRESS NOTES
Assessment/Plan:         Diagnoses and all orders for this visit:    Encounter for gynecological examination without abnormal finding  -     Liquid-based pap, screening    Atrophic vaginitis; continue Estrace    Screening for osteoporosis; recheck DEXA scan    Other orders  -     VITAMIN D PO; Take by mouth        Subjective:      Patient ID: Holley Friend is a 65 y o  female  HPI  patient presents for annual examination  She offers no complaints  She did have an episode of postmenopausal bleeding in January  At that time workup involved ultrasound which revealed endometrial thickness of 4 3 mm  There were 3 fibroids measuring 4 2, 2 7 and 1 9 cm  There was an attempt at an endometrial biopsy but due to stenotic cervix and patient discomfort the procedure was aborted  Acute she has had no further bleeding since then  She denies any dysuria, hematuria urgency or urgency incontinence  Does have mild tolerable stress urinary incontinence  No GI complaints  She continues use Estrace vaginal cream 1 g weekly  She is no longer sexually active but she has found that if she does not use the Estrace cream she does have significant burning    The following portions of the patient's history were reviewed and updated as appropriate:   She  has a past medical history of Hypertension  She There are no problems to display for this patient  She  has a past surgical history that includes Fracture surgery; Cataract extraction; Bladder suspension; and Squamous cell carcinoma excision  Her family history includes Breast cancer (age of onset: 61) in her maternal aunt; Colon cancer (age of onset: 77) in her father; Lung cancer (age of onset: 76) in her father; No Known Problems in her daughter, daughter, maternal grandfather, maternal grandmother, mother, paternal grandfather, paternal grandmother, and sister  She  reports that she has never smoked   She has never used smokeless tobacco  She reports current alcohol use  She reports that she does not use drugs  Current Outpatient Medications   Medication Sig Dispense Refill    estradiol (ESTRACE) 0 1 mg/g vaginal cream INSERT 1 GRAM VAGINALLY TWICE A WEEK AS MAINTENCE DOSE 42 5 g 5    Flaxseed, Linseed, (Flaxseed Oil) 1200 MG CAPS Flaxseed Oil CAPS   Refills: 0      , M D ; Active      losartan-hydrochlorothiazide (HYZAAR) 50-12 5 mg per tablet Take by mouth      Lutein 20 MG TABS Take 20 mg by mouth daily      Magnesium Citrate 100 MG TABS Take 200 mg by mouth      naproxen sodium (ALEVE) 220 MG tablet Take 220 mg by mouth every 12 (twelve) hours as needed      Omega-3 Fatty Acids (FISH OIL) 645 MG CAPS Take by mouth      VITAMIN D PO Take by mouth      losartan-hydrochlorothiazide (HYZAAR) 50-12 5 mg per tablet       Lutein 20 MG TABS Take by mouth (Patient not taking: Reported on 8/18/2022)      Omega-3 Fatty Acids (Fish Oil) 645 MG CAPS Fish Oil CAPS   Refills: 0      , M D ; Active (Patient not taking: Reported on 8/18/2022)       No current facility-administered medications for this visit       Current Outpatient Medications on File Prior to Visit   Medication Sig    estradiol (ESTRACE) 0 1 mg/g vaginal cream INSERT 1 GRAM VAGINALLY TWICE A WEEK AS MAINTENCE DOSE    Flaxseed, Linseed, (Flaxseed Oil) 1200 MG CAPS Flaxseed Oil CAPS   Refills: 0      , M D ; Active    losartan-hydrochlorothiazide (HYZAAR) 50-12 5 mg per tablet Take by mouth    Lutein 20 MG TABS Take 20 mg by mouth daily    Magnesium Citrate 100 MG TABS Take 200 mg by mouth    naproxen sodium (ALEVE) 220 MG tablet Take 220 mg by mouth every 12 (twelve) hours as needed    Omega-3 Fatty Acids (FISH OIL) 645 MG CAPS Take by mouth    VITAMIN D PO Take by mouth    losartan-hydrochlorothiazide (HYZAAR) 50-12 5 mg per tablet     Lutein 20 MG TABS Take by mouth (Patient not taking: Reported on 8/18/2022)    Omega-3 Fatty Acids (Fish Oil) 645 MG CAPS Fish Oil CAPS   Refills: 0      , M D ; Active (Patient not taking: Reported on 8/18/2022)     No current facility-administered medications on file prior to visit  She is allergic to penicillins and pollen extract       Review of Systems   Constitutional: Negative  HENT: Negative for sore throat and trouble swallowing  Gastrointestinal: Negative  Genitourinary: Negative  Objective:      /78   Pulse 55          Physical Exam  Vitals reviewed  Constitutional:       Appearance: Normal appearance  She is normal weight  Cardiovascular:      Rate and Rhythm: Normal rate and regular rhythm  Pulses: Normal pulses  Heart sounds: Normal heart sounds  Pulmonary:      Effort: Pulmonary effort is normal       Breath sounds: Normal breath sounds  Chest:   Breasts:      Right: No swelling, bleeding, inverted nipple, mass, nipple discharge, skin change, tenderness, axillary adenopathy or supraclavicular adenopathy  Left: No swelling, bleeding, inverted nipple, mass, nipple discharge, skin change, tenderness, axillary adenopathy or supraclavicular adenopathy  Abdominal:      General: There is no distension  Palpations: Abdomen is soft  There is no mass  Tenderness: There is no abdominal tenderness  There is no guarding or rebound  Hernia: No hernia is present  There is no hernia in the left inguinal area or right inguinal area  Genitourinary:     General: Normal vulva  Labia:         Right: No rash, tenderness or lesion  Left: No rash, tenderness or lesion  Comments: Uterus anteverted normal size and contour freely mobile and nontender  There are no palpable adnexal masses or tenderness  Cervix appears normal   Vagina with atrophic changes  B[place]'s and [place]'s glands normal   Urethral orifice normal   No cystocele or rectocele  Musculoskeletal:      Cervical back: Normal range of motion and neck supple  No tenderness     Lymphadenopathy:      Cervical: No cervical adenopathy  Upper Body:      Right upper body: No supraclavicular, axillary or pectoral adenopathy  Left upper body: No supraclavicular, axillary or pectoral adenopathy  Lower Body: No right inguinal adenopathy  No left inguinal adenopathy  Neurological:      Mental Status: She is alert

## 2022-08-25 LAB
LAB AP GYN PRIMARY INTERPRETATION: NORMAL
Lab: NORMAL

## 2022-09-21 ENCOUNTER — APPOINTMENT (OUTPATIENT)
Dept: LAB | Facility: MEDICAL CENTER | Age: 77
End: 2022-09-21
Payer: MEDICARE

## 2022-09-21 DIAGNOSIS — M65.842 OTHER SYNOVITIS AND TENOSYNOVITIS, LEFT HAND: ICD-10-CM

## 2022-09-21 LAB
BASOPHILS # BLD AUTO: 0.02 THOUSANDS/ΜL (ref 0–0.1)
BASOPHILS NFR BLD AUTO: 1 % (ref 0–1)
CRP SERPL QL: <3 MG/L
EOSINOPHIL # BLD AUTO: 0.25 THOUSAND/ΜL (ref 0–0.61)
EOSINOPHIL NFR BLD AUTO: 7 % (ref 0–6)
ERYTHROCYTE [DISTWIDTH] IN BLOOD BY AUTOMATED COUNT: 14.5 % (ref 11.6–15.1)
ERYTHROCYTE [SEDIMENTATION RATE] IN BLOOD: 15 MM/HOUR (ref 0–29)
HCT VFR BLD AUTO: 39.9 % (ref 34.8–46.1)
HGB BLD-MCNC: 12.6 G/DL (ref 11.5–15.4)
IMM GRANULOCYTES # BLD AUTO: 0 THOUSAND/UL (ref 0–0.2)
IMM GRANULOCYTES NFR BLD AUTO: 0 % (ref 0–2)
LYMPHOCYTES # BLD AUTO: 1.2 THOUSANDS/ΜL (ref 0.6–4.47)
LYMPHOCYTES NFR BLD AUTO: 31 % (ref 14–44)
MCH RBC QN AUTO: 30.5 PG (ref 26.8–34.3)
MCHC RBC AUTO-ENTMCNC: 31.6 G/DL (ref 31.4–37.4)
MCV RBC AUTO: 97 FL (ref 82–98)
MONOCYTES # BLD AUTO: 0.45 THOUSAND/ΜL (ref 0.17–1.22)
MONOCYTES NFR BLD AUTO: 12 % (ref 4–12)
NEUTROPHILS # BLD AUTO: 1.94 THOUSANDS/ΜL (ref 1.85–7.62)
NEUTS SEG NFR BLD AUTO: 49 % (ref 43–75)
NRBC BLD AUTO-RTO: 0 /100 WBCS
PLATELET # BLD AUTO: 268 THOUSANDS/UL (ref 149–390)
PMV BLD AUTO: 10.9 FL (ref 8.9–12.7)
RBC # BLD AUTO: 4.13 MILLION/UL (ref 3.81–5.12)
WBC # BLD AUTO: 3.86 THOUSAND/UL (ref 4.31–10.16)

## 2022-09-21 PROCEDURE — 85652 RBC SED RATE AUTOMATED: CPT

## 2022-09-21 PROCEDURE — 85025 COMPLETE CBC W/AUTO DIFF WBC: CPT

## 2022-09-21 PROCEDURE — 86430 RHEUMATOID FACTOR TEST QUAL: CPT

## 2022-09-21 PROCEDURE — 36415 COLL VENOUS BLD VENIPUNCTURE: CPT

## 2022-09-21 PROCEDURE — 86140 C-REACTIVE PROTEIN: CPT

## 2022-09-21 PROCEDURE — 86038 ANTINUCLEAR ANTIBODIES: CPT

## 2022-09-21 PROCEDURE — 86200 CCP ANTIBODY: CPT

## 2022-09-21 PROCEDURE — 86618 LYME DISEASE ANTIBODY: CPT

## 2022-09-22 LAB
B BURGDOR IGG+IGM SER-ACNC: <0.2 AI
RHEUMATOID FACT SER QL LA: NEGATIVE

## 2022-09-23 LAB
CCP AB SER IA-ACNC: 1.6
RYE IGE QN: NEGATIVE

## 2022-10-03 ENCOUNTER — HOSPITAL ENCOUNTER (OUTPATIENT)
Dept: BONE DENSITY | Facility: MEDICAL CENTER | Age: 77
Discharge: HOME | End: 2022-10-03
Payer: MEDICARE

## 2022-10-03 DIAGNOSIS — Z78.0 MENOPAUSE: ICD-10-CM

## 2022-10-03 PROCEDURE — 77080 DXA BONE DENSITY AXIAL: CPT

## 2022-11-15 DIAGNOSIS — N95.2 ATROPHY OF VAGINA: ICD-10-CM

## 2022-11-15 RX ORDER — ESTRADIOL 0.1 MG/G
CREAM VAGINAL
Qty: 42.5 G | Refills: 5 | Status: SHIPPED | OUTPATIENT
Start: 2022-11-15

## 2022-11-25 ENCOUNTER — HOSPITAL ENCOUNTER (OUTPATIENT)
Dept: MAMMOGRAPHY | Facility: MEDICAL CENTER | Age: 77
Discharge: HOME | End: 2022-11-25

## 2022-11-25 VITALS — WEIGHT: 173.06 LBS | BODY MASS INDEX: 26.23 KG/M2 | HEIGHT: 68 IN

## 2022-11-25 DIAGNOSIS — Z12.31 ENCOUNTER FOR SCREENING MAMMOGRAM FOR BREAST CANCER: ICD-10-CM

## 2023-10-17 ENCOUNTER — OFFICE VISIT (OUTPATIENT)
Dept: GYNECOLOGY | Facility: CLINIC | Age: 78
End: 2023-10-17

## 2023-10-17 VITALS
HEIGHT: 68 IN | HEART RATE: 60 BPM | DIASTOLIC BLOOD PRESSURE: 72 MMHG | BODY MASS INDEX: 26.31 KG/M2 | SYSTOLIC BLOOD PRESSURE: 120 MMHG

## 2023-10-17 DIAGNOSIS — N95.0 PMB (POSTMENOPAUSAL BLEEDING): Primary | ICD-10-CM

## 2023-10-17 NOTE — PROGRESS NOTES
Assessment/Plan:    Will schedule TVS/SIS/poss EMB. Diagnoses and all orders for this visit:    PMB (postmenopausal bleeding)        Subjective:      Patient ID: Connie Rocha is a 77 y.o. female. 76 yo presents with brown bleeding. She states she saw a streak of blood on pad yesterday morning. Brown bleeding was much heavier after PT yesterday. Today bleeding has lightened to spotting once again. She had 4 steroid injections at [facility] on 9/28/23. She is using vaginal estrogen consistently. This episode of bleeding was not in relation to sex. She had an episode of PMB 1/2022 with in office TVS/SIS with following findings:   Retroverted uterus demonstrates multiple fibroids with calcifications. Largest are right subserosal 4.2, right lower uterine segment subserosal 2.7cm, and left subserosal 1.9cm. The bilateral ovaries appear within normal limits. No free fluid. Multiple nabothian cysts are noted in the cervix. Endometrial lining of 4.3 mm. EMB not done secondary to stenotic os. The following portions of the patient's history were reviewed and updated as appropriate: allergies, current medications, past family history, past medical history, past social history, past surgical history and problem list.    Review of Systems   Constitutional: Negative. Respiratory: Negative. Cardiovascular: Negative. Gastrointestinal: Negative. Endocrine: Negative. Genitourinary:  Positive for vaginal bleeding. Negative for dyspareunia, dysuria, frequency, pelvic pain, urgency, vaginal discharge and vaginal pain. Musculoskeletal: Negative. Skin: Negative. Neurological: Negative. Psychiatric/Behavioral: Negative. Objective:      /72   Pulse 60   Ht 5' 8" (1.727 m)   BMI 26.31 kg/m²          Physical Exam  Vitals and nursing note reviewed. Exam conducted with a chaperone present. Constitutional:       Appearance: Normal appearance.    HENT:      Head: Normocephalic and atraumatic. Pulmonary:      Effort: Pulmonary effort is normal.   Abdominal:      Hernia: There is no hernia in the left inguinal area or right inguinal area. Genitourinary:     General: Normal vulva. Exam position: Supine. Pubic Area: No rash. Labia:         Right: No rash, tenderness, lesion or injury. Left: No rash, tenderness, lesion or injury. Urethra: No prolapse, urethral pain, urethral swelling or urethral lesion. Vagina: No signs of injury and foreign body. Bleeding (moderate amount of brown blood noted in vagina and from cervical os) present. No vaginal discharge, erythema, tenderness, lesions or prolapsed vaginal walls. Cervix: Cervical bleeding present. No cervical motion tenderness, friability, lesion or erythema. Uterus: Not deviated, not enlarged, not fixed, not tender and no uterine prolapse. Musculoskeletal:         General: Normal range of motion. Cervical back: Normal range of motion. Lymphadenopathy:      Lower Body: No right inguinal adenopathy. No left inguinal adenopathy. Skin:     General: Skin is warm and dry. Neurological:      Mental Status: She is alert and oriented to person, place, and time. Psychiatric:         Mood and Affect: Mood normal.         Behavior: Behavior normal.         Thought Content:  Thought content normal.         Judgment: Judgment normal.

## 2023-10-30 ENCOUNTER — OFFICE VISIT (OUTPATIENT)
Dept: GYNECOLOGY | Facility: CLINIC | Age: 78
End: 2023-10-30
Payer: MEDICARE

## 2023-10-30 VITALS — SYSTOLIC BLOOD PRESSURE: 140 MMHG | HEART RATE: 62 BPM | DIASTOLIC BLOOD PRESSURE: 60 MMHG

## 2023-10-30 DIAGNOSIS — N76.4 VULVAR BOIL: Primary | ICD-10-CM

## 2023-10-30 PROCEDURE — 87205 SMEAR GRAM STAIN: CPT | Performed by: OBSTETRICS & GYNECOLOGY

## 2023-10-30 PROCEDURE — 87070 CULTURE OTHR SPECIMN AEROBIC: CPT | Performed by: OBSTETRICS & GYNECOLOGY

## 2023-10-30 PROCEDURE — 99213 OFFICE O/P EST LOW 20 MIN: CPT | Performed by: OBSTETRICS & GYNECOLOGY

## 2023-10-30 PROCEDURE — 87147 CULTURE TYPE IMMUNOLOGIC: CPT | Performed by: OBSTETRICS & GYNECOLOGY

## 2023-10-30 RX ORDER — SULFAMETHOXAZOLE AND TRIMETHOPRIM 800; 160 MG/1; MG/1
1 TABLET ORAL EVERY 12 HOURS SCHEDULED
Qty: 14 TABLET | Refills: 1 | Status: SHIPPED | OUTPATIENT
Start: 2023-10-30 | End: 2023-11-06

## 2023-10-30 NOTE — PROGRESS NOTES
Assessment/Plan:    Vulvar boil drained, wound culture sent. Will start Bactrim DS twice daily for 7 days with 1 refill if needed. Advised to call if sx persist or worsen. Diagnoses and all orders for this visit:    Vulvar boil  -     sulfamethoxazole-trimethoprim (BACTRIM DS) 800-160 mg per tablet; Take 1 tablet by mouth every 12 (twelve) hours for 7 days        Subjective:      Patient ID: Tan Oneal is a 77 y.o. female. Pt presents with vulvar boil for 5 days. It opened up and is much smaller and less tender today. Of note, she is scheduled for TVS/SIS/poss EMB this week for PMB. No other gyn concerns. The following portions of the patient's history were reviewed and updated as appropriate: allergies, current medications, past family history, past medical history, past social history, past surgical history and problem list.    Review of Systems   Constitutional: Negative. Respiratory: Negative. Cardiovascular: Negative. Gastrointestinal: Negative. Endocrine: Negative. Genitourinary:  Positive for genital sores and vaginal bleeding. Negative for dysuria, frequency, pelvic pain, urgency, vaginal discharge and vaginal pain. Musculoskeletal: Negative. Skin: Negative. Neurological: Negative. Psychiatric/Behavioral: Negative. Objective:      /60   Pulse 62          Physical Exam  Vitals and nursing note reviewed. Exam conducted with a chaperone present. Constitutional:       Appearance: Normal appearance. HENT:      Head: Normocephalic and atraumatic. Pulmonary:      Effort: Pulmonary effort is normal.   Abdominal:      Hernia: There is no hernia in the left inguinal area or right inguinal area. Genitourinary:     Exam position: Supine. Pubic Area: No rash. Labia:         Right: Tenderness and lesion present. No rash or injury. Left: No rash, tenderness, lesion or injury.         Musculoskeletal:         General: Normal range of motion. Cervical back: Normal range of motion. Lymphadenopathy:      Lower Body: No right inguinal adenopathy. No left inguinal adenopathy. Skin:     General: Skin is warm and dry. Neurological:      Mental Status: She is alert and oriented to person, place, and time. Psychiatric:         Mood and Affect: Mood normal.         Behavior: Behavior normal.         Thought Content:  Thought content normal.         Judgment: Judgment normal.

## 2023-11-01 ENCOUNTER — ULTRASOUND (OUTPATIENT)
Dept: GYNECOLOGY | Facility: CLINIC | Age: 78
End: 2023-11-01
Payer: MEDICARE

## 2023-11-01 ENCOUNTER — OFFICE VISIT (OUTPATIENT)
Dept: GYNECOLOGY | Facility: CLINIC | Age: 78
End: 2023-11-01
Payer: MEDICARE

## 2023-11-01 DIAGNOSIS — Z01.818 PREOP TESTING: Primary | ICD-10-CM

## 2023-11-01 DIAGNOSIS — N95.0 PMB (POSTMENOPAUSAL BLEEDING): Primary | ICD-10-CM

## 2023-11-01 DIAGNOSIS — N95.0 PMB (POSTMENOPAUSAL BLEEDING): ICD-10-CM

## 2023-11-01 DIAGNOSIS — D21.9 FIBROIDS: ICD-10-CM

## 2023-11-01 LAB
BACTERIA WND AEROBE CULT: ABNORMAL
GRAM STN SPEC: ABNORMAL

## 2023-11-01 PROCEDURE — 99215 OFFICE O/P EST HI 40 MIN: CPT | Performed by: OBSTETRICS & GYNECOLOGY

## 2023-11-01 PROCEDURE — 58100 BIOPSY OF UTERUS LINING: CPT | Performed by: OBSTETRICS & GYNECOLOGY

## 2023-11-01 PROCEDURE — 76830 TRANSVAGINAL US NON-OB: CPT | Performed by: OBSTETRICS & GYNECOLOGY

## 2023-11-01 NOTE — PROGRESS NOTES
AMB US Pelvic Non OB    Date/Time: 11/1/2023 3:00 PM    Performed by: Abraham Charles  Authorized by: Barnabas Lefort, DO  Universal Protocol:  Consent given by: patient  Patient identity confirmed: verbally with patient    Procedure details:     Indications: non-obstetric vaginal bleeding      Technique:  Transvaginal US, Non-OB    Position: lithotomy exam    Uterine findings:     Length (cm): 8.9    Height (cm):  6.08    Width (cm):  7.33    Endometrial stripe: identified      Endometrium thickness (mm):  13.05  Left ovary findings:     Left ovary:  Visualized    Length (cm): 2.16    Height (cm): 1.39    Width (cm): 1.53  Right ovary findings:     Right ovary:  Visualized    Length (cm): 2.53    Height (cm): 1.56    Width (cm): 1.59  Other findings:     Free pelvic fluid: not identified      Free peritoneal fluid: not identified    Post-Procedure Details:     Impression:  Retroverted uterus demonstrates multiple fibroids with calcifications. Largest are right subserosal 5.1 (previously 4.2), right lower uterine segment subserosal 3.0cm (previoulsy 2.7cm), and left subserosal 2.3cm (previously 1.9cm). There are additional calcifications, likely small fibroids also noted throughout the uterus. The bilateral ovaries appear within normal limits. No free fluid. Multiple nabothian cysts are noted in the cervix. Tolerance: Tolerated well, no immediate complications    Complications: no complications    Additional Procedure Comments:      GE Voluson P8 transvaginal transducer RIC5-RA with Serial Number [id number] was used during procedure and subsequently cleaned with high level disinfection utilizing the [employer].      Ultrasound performed at:      for [address]  [facility], [address] [place] [address]  Phone: [phone]  Fax:  [phone]      Endometrial biopsy    Date/Time: 11/1/2023 3:00 PM    Performed by: Barnabas Lefort, DO  Authorized by: Eyal Max DO  Universal Protocol:  Consent given by: patient  Patient understanding: patient states understanding of the procedure being performed  Patient identity confirmed: verbally with patient    Procedure:     Procedure: endometrial biopsy with Pipelle      A bivalve speculum was placed in the vagina: yes      Cervix cleaned and prepped: yes      Patient tolerated procedure well with no complications: yes    Comments:     Procedure comments:  Biopsy was attempted and subsequently discontinued due to cervical stenosis.

## 2023-11-01 NOTE — PROGRESS NOTES
Assessment/Plan:       Diagnoses and all orders for this visit:    Preop testing  -     CBC and differential; Future  -     ECG 12 lead; Future    PMB (postmenopausal bleeding)/endometrial thickening on ultrasound; is admitted for hysteroscopy D&C. Procedure and risks reviewed. Fibroids      Subjective:      Patient ID: Dayne Sanders is a 77 y.o. female. HPI patient presented to the office complaining of postmenopausal bleeding. She 7 days of light vaginal bleeding. Of note is that she has had 4 steroid injections at [facility]. She has been using vaginal estrogen consistently. Transvaginal scan:;AMB US Pelvic Non OB     Date/Time: 11/1/2023 3:00 PM     Performed by: Paola Johnson  Authorized by: Mandy Casillas DO  Universal Protocol:  Consent given by: patient  Patient identity confirmed: verbally with patient     Procedure details:     Indications: non-obstetric vaginal bleeding      Technique:  Transvaginal US, Non-OB    Position: lithotomy exam    Uterine findings:     Length (cm): 8.9    Height (cm):  6.08    Width (cm):  7.33    Endometrial stripe: identified      Endometrium thickness (mm):  13.05  Left ovary findings:     Left ovary:  Visualized    Length (cm): 2.16    Height (cm): 1.39    Width (cm): 1.53  Right ovary findings:     Right ovary:  Visualized    Length (cm): 2.53    Height (cm): 1.56    Width (cm): 1.59  Other findings:     Free pelvic fluid: not identified      Free peritoneal fluid: not identified    Post-Procedure Details:     Impression:  Retroverted uterus demonstrates multiple fibroids with calcifications. Largest are right subserosal 5.1 (previously 4.2), right lower uterine segment subserosal 3.0cm (previoulsy 2.7cm), and left subserosal 2.3cm (previously 1.9cm). There are additional calcifications, likely small fibroids also noted throughout the uterus. The bilateral ovaries appear within normal limits. No free fluid.  Multiple nabothian cysts are noted in the cervix. Tolerance: Tolerated well, no immediate complications    Complications: no complications        Because of the endometrium at 13 mm an attempt was made at endometrial biopsy however this was unsuccessful due to stenotic cervix and patient discomfort. The following portions of the patient's history were reviewed and updated as appropriate: She  has a past medical history of Hypertension. She There are no problems to display for this patient. She  has a past surgical history that includes Fracture surgery; Cataract extraction; Bladder suspension; and Squamous cell carcinoma excision. Her family history includes Breast cancer (age of onset: 61) in her maternal aunt; Colon cancer (age of onset: 77) in her father; Lung cancer (age of onset: 76) in her father; No Known Problems in her daughter, daughter, maternal grandfather, maternal grandmother, mother, paternal grandfather, paternal grandmother, and sister. She  reports that she has never smoked. She has never used smokeless tobacco. She reports current alcohol use. She reports that she does not use drugs.   Current Outpatient Medications   Medication Sig Dispense Refill    estradiol (ESTRACE) 0.1 mg/g vaginal cream INSERT 1 GRAM VAGINALLY TWICE A WEEK AS MAINTENCE DOSE 42.5 g 5    Flaxseed, Linseed, (Flaxseed Oil) 1200 MG CAPS Flaxseed Oil CAPS   Refills: 0      , M.D.; Active      losartan-hydrochlorothiazide (HYZAAR) 50-12.5 mg per tablet       losartan-hydrochlorothiazide (HYZAAR) 50-12.5 mg per tablet Take by mouth      Lutein 20 MG TABS Take 20 mg by mouth daily      Lutein 20 MG TABS Take by mouth (Patient not taking: Reported on 8/18/2022)      Magnesium Citrate 100 MG TABS Take 200 mg by mouth      naproxen sodium (ALEVE) 220 MG tablet Take 220 mg by mouth every 12 (twelve) hours as needed      Omega-3 Fatty Acids (FISH OIL) 645 MG CAPS Take by mouth      Omega-3 Fatty Acids (Fish Oil) 645 MG CAPS Fish Oil CAPS   Refills: 0      , M.D.; Active (Patient not taking: Reported on 8/18/2022)      sulfamethoxazole-trimethoprim (BACTRIM DS) 800-160 mg per tablet Take 1 tablet by mouth every 12 (twelve) hours for 7 days 14 tablet 1    VITAMIN D PO Take by mouth (Patient not taking: Reported on 10/30/2023)       No current facility-administered medications for this visit. Current Outpatient Medications on File Prior to Visit   Medication Sig    estradiol (ESTRACE) 0.1 mg/g vaginal cream INSERT 1 GRAM VAGINALLY TWICE A WEEK AS MAINTENCE DOSE    Flaxseed, Linseed, (Flaxseed Oil) 1200 MG CAPS Flaxseed Oil CAPS   Refills: 0      , M.D.; Active    losartan-hydrochlorothiazide (HYZAAR) 50-12.5 mg per tablet     losartan-hydrochlorothiazide (HYZAAR) 50-12.5 mg per tablet Take by mouth    Lutein 20 MG TABS Take 20 mg by mouth daily    Lutein 20 MG TABS Take by mouth (Patient not taking: Reported on 8/18/2022)    Magnesium Citrate 100 MG TABS Take 200 mg by mouth    naproxen sodium (ALEVE) 220 MG tablet Take 220 mg by mouth every 12 (twelve) hours as needed    Omega-3 Fatty Acids (FISH OIL) 645 MG CAPS Take by mouth    Omega-3 Fatty Acids (Fish Oil) 645 MG CAPS Fish Oil CAPS   Refills: 0      , M.D.; Active (Patient not taking: Reported on 8/18/2022)    sulfamethoxazole-trimethoprim (BACTRIM DS) 800-160 mg per tablet Take 1 tablet by mouth every 12 (twelve) hours for 7 days    VITAMIN D PO Take by mouth (Patient not taking: Reported on 10/30/2023)     No current facility-administered medications on file prior to visit. She is allergic to penicillins and pollen extract. .    Review of Systems      Objective: There were no vitals taken for this visit. Physical Exam  Vitals reviewed. Cardiovascular:      Rate and Rhythm: Normal rate and regular rhythm. Pulses: Normal pulses. Heart sounds: Normal heart sounds. Pulmonary:      Effort: Pulmonary effort is normal.      Breath sounds: Normal breath sounds.    Abdominal:      General: There is no distension. Palpations: Abdomen is soft. There is no mass. Tenderness: There is no abdominal tenderness. There is no guarding or rebound. Hernia: No hernia is present. There is no hernia in the left inguinal area or right inguinal area. Genitourinary:     General: Normal vulva. Labia:         Right: No rash, tenderness or lesion. Left: No rash, tenderness or lesion. Vagina: Normal.      Cervix: Normal.      Uterus: Normal.       Adnexa:         Right: No mass, tenderness or fullness. Left: No mass, tenderness or fullness. Lymphadenopathy:      Lower Body: No right inguinal adenopathy. No left inguinal adenopathy. Neurological:      Mental Status: She is alert.

## 2023-11-01 NOTE — H&P (VIEW-ONLY)
Assessment/Plan:       Diagnoses and all orders for this visit:    Preop testing  -     CBC and differential; Future  -     ECG 12 lead; Future    PMB (postmenopausal bleeding)/endometrial thickening on ultrasound; is admitted for hysteroscopy D&C. Procedure and risks reviewed. Fibroids      Subjective:      Patient ID: Mary Rehman is a 77 y.o. female. HPI patient presented to the office complaining of postmenopausal bleeding. She 7 days of light vaginal bleeding. Of note is that she has had 4 steroid injections at [facility]. She has been using vaginal estrogen consistently. Transvaginal scan:;AMB US Pelvic Non OB     Date/Time: 11/1/2023 3:00 PM     Performed by: Sandra Gardner  Authorized by: Gladis Garibay DO  Universal Protocol:  Consent given by: patient  Patient identity confirmed: verbally with patient     Procedure details:     Indications: non-obstetric vaginal bleeding      Technique:  Transvaginal US, Non-OB    Position: lithotomy exam    Uterine findings:     Length (cm): 8.9    Height (cm):  6.08    Width (cm):  7.33    Endometrial stripe: identified      Endometrium thickness (mm):  13.05  Left ovary findings:     Left ovary:  Visualized    Length (cm): 2.16    Height (cm): 1.39    Width (cm): 1.53  Right ovary findings:     Right ovary:  Visualized    Length (cm): 2.53    Height (cm): 1.56    Width (cm): 1.59  Other findings:     Free pelvic fluid: not identified      Free peritoneal fluid: not identified    Post-Procedure Details:     Impression:  Retroverted uterus demonstrates multiple fibroids with calcifications. Largest are right subserosal 5.1 (previously 4.2), right lower uterine segment subserosal 3.0cm (previoulsy 2.7cm), and left subserosal 2.3cm (previously 1.9cm). There are additional calcifications, likely small fibroids also noted throughout the uterus. The bilateral ovaries appear within normal limits. No free fluid.  Multiple nabothian cysts are noted in the cervix. Tolerance: Tolerated well, no immediate complications    Complications: no complications        Because of the endometrium at 13 mm an attempt was made at endometrial biopsy however this was unsuccessful due to stenotic cervix and patient discomfort. The following portions of the patient's history were reviewed and updated as appropriate: She  has a past medical history of Hypertension. She There are no problems to display for this patient. She  has a past surgical history that includes Fracture surgery; Cataract extraction; Bladder suspension; and Squamous cell carcinoma excision. Her family history includes Breast cancer (age of onset: 61) in her maternal aunt; Colon cancer (age of onset: 77) in her father; Lung cancer (age of onset: 76) in her father; No Known Problems in her daughter, daughter, maternal grandfather, maternal grandmother, mother, paternal grandfather, paternal grandmother, and sister. She  reports that she has never smoked. She has never used smokeless tobacco. She reports current alcohol use. She reports that she does not use drugs.   Current Outpatient Medications   Medication Sig Dispense Refill    estradiol (ESTRACE) 0.1 mg/g vaginal cream INSERT 1 GRAM VAGINALLY TWICE A WEEK AS MAINTENCE DOSE 42.5 g 5    Flaxseed, Linseed, (Flaxseed Oil) 1200 MG CAPS Flaxseed Oil CAPS   Refills: 0      , M.D.; Active      losartan-hydrochlorothiazide (HYZAAR) 50-12.5 mg per tablet       losartan-hydrochlorothiazide (HYZAAR) 50-12.5 mg per tablet Take by mouth      Lutein 20 MG TABS Take 20 mg by mouth daily      Lutein 20 MG TABS Take by mouth (Patient not taking: Reported on 8/18/2022)      Magnesium Citrate 100 MG TABS Take 200 mg by mouth      naproxen sodium (ALEVE) 220 MG tablet Take 220 mg by mouth every 12 (twelve) hours as needed      Omega-3 Fatty Acids (FISH OIL) 645 MG CAPS Take by mouth      Omega-3 Fatty Acids (Fish Oil) 645 MG CAPS Fish Oil CAPS   Refills: 0      , M.D.; Active (Patient not taking: Reported on 8/18/2022)      sulfamethoxazole-trimethoprim (BACTRIM DS) 800-160 mg per tablet Take 1 tablet by mouth every 12 (twelve) hours for 7 days 14 tablet 1    VITAMIN D PO Take by mouth (Patient not taking: Reported on 10/30/2023)       No current facility-administered medications for this visit. Current Outpatient Medications on File Prior to Visit   Medication Sig    estradiol (ESTRACE) 0.1 mg/g vaginal cream INSERT 1 GRAM VAGINALLY TWICE A WEEK AS MAINTENCE DOSE    Flaxseed, Linseed, (Flaxseed Oil) 1200 MG CAPS Flaxseed Oil CAPS   Refills: 0      , M.D.; Active    losartan-hydrochlorothiazide (HYZAAR) 50-12.5 mg per tablet     losartan-hydrochlorothiazide (HYZAAR) 50-12.5 mg per tablet Take by mouth    Lutein 20 MG TABS Take 20 mg by mouth daily    Lutein 20 MG TABS Take by mouth (Patient not taking: Reported on 8/18/2022)    Magnesium Citrate 100 MG TABS Take 200 mg by mouth    naproxen sodium (ALEVE) 220 MG tablet Take 220 mg by mouth every 12 (twelve) hours as needed    Omega-3 Fatty Acids (FISH OIL) 645 MG CAPS Take by mouth    Omega-3 Fatty Acids (Fish Oil) 645 MG CAPS Fish Oil CAPS   Refills: 0      , M.D.; Active (Patient not taking: Reported on 8/18/2022)    sulfamethoxazole-trimethoprim (BACTRIM DS) 800-160 mg per tablet Take 1 tablet by mouth every 12 (twelve) hours for 7 days    VITAMIN D PO Take by mouth (Patient not taking: Reported on 10/30/2023)     No current facility-administered medications on file prior to visit. She is allergic to penicillins and pollen extract. .    Review of Systems      Objective: There were no vitals taken for this visit. Physical Exam  Vitals reviewed. Cardiovascular:      Rate and Rhythm: Normal rate and regular rhythm. Pulses: Normal pulses. Heart sounds: Normal heart sounds. Pulmonary:      Effort: Pulmonary effort is normal.      Breath sounds: Normal breath sounds.    Abdominal:      General: There is no distension. Palpations: Abdomen is soft. There is no mass. Tenderness: There is no abdominal tenderness. There is no guarding or rebound. Hernia: No hernia is present. There is no hernia in the left inguinal area or right inguinal area. Genitourinary:     General: Normal vulva. Labia:         Right: No rash, tenderness or lesion. Left: No rash, tenderness or lesion. Vagina: Normal.      Cervix: Normal.      Uterus: Normal.       Adnexa:         Right: No mass, tenderness or fullness. Left: No mass, tenderness or fullness. Lymphadenopathy:      Lower Body: No right inguinal adenopathy. No left inguinal adenopathy. Neurological:      Mental Status: She is alert.

## 2023-11-03 ENCOUNTER — APPOINTMENT (OUTPATIENT)
Dept: LAB | Facility: MEDICAL CENTER | Age: 78
End: 2023-11-03
Payer: MEDICARE

## 2023-11-03 DIAGNOSIS — Z01.818 PREOP TESTING: ICD-10-CM

## 2023-11-03 LAB
BASOPHILS # BLD AUTO: 0.03 THOUSANDS/ÂΜL (ref 0–0.1)
BASOPHILS NFR BLD AUTO: 1 % (ref 0–1)
EOSINOPHIL # BLD AUTO: 0.28 THOUSAND/ÂΜL (ref 0–0.61)
EOSINOPHIL NFR BLD AUTO: 5 % (ref 0–6)
ERYTHROCYTE [DISTWIDTH] IN BLOOD BY AUTOMATED COUNT: 14.6 % (ref 11.6–15.1)
HCT VFR BLD AUTO: 40.6 % (ref 34.8–46.1)
HGB BLD-MCNC: 12.9 G/DL (ref 11.5–15.4)
IMM GRANULOCYTES # BLD AUTO: 0.02 THOUSAND/UL (ref 0–0.2)
IMM GRANULOCYTES NFR BLD AUTO: 0 % (ref 0–2)
LYMPHOCYTES # BLD AUTO: 1.42 THOUSANDS/ÂΜL (ref 0.6–4.47)
LYMPHOCYTES NFR BLD AUTO: 23 % (ref 14–44)
MCH RBC QN AUTO: 30.4 PG (ref 26.8–34.3)
MCHC RBC AUTO-ENTMCNC: 31.8 G/DL (ref 31.4–37.4)
MCV RBC AUTO: 96 FL (ref 82–98)
MONOCYTES # BLD AUTO: 0.72 THOUSAND/ÂΜL (ref 0.17–1.22)
MONOCYTES NFR BLD AUTO: 12 % (ref 4–12)
NEUTROPHILS # BLD AUTO: 3.78 THOUSANDS/ÂΜL (ref 1.85–7.62)
NEUTS SEG NFR BLD AUTO: 59 % (ref 43–75)
NRBC BLD AUTO-RTO: 0 /100 WBCS
PLATELET # BLD AUTO: 265 THOUSANDS/UL (ref 149–390)
PMV BLD AUTO: 11.2 FL (ref 8.9–12.7)
RBC # BLD AUTO: 4.25 MILLION/UL (ref 3.81–5.12)
WBC # BLD AUTO: 6.25 THOUSAND/UL (ref 4.31–10.16)

## 2023-11-03 PROCEDURE — 36415 COLL VENOUS BLD VENIPUNCTURE: CPT

## 2023-11-03 PROCEDURE — 85025 COMPLETE CBC W/AUTO DIFF WBC: CPT

## 2023-11-09 ENCOUNTER — ANESTHESIA EVENT (OUTPATIENT)
Dept: PERIOP | Facility: HOSPITAL | Age: 78
End: 2023-11-09
Payer: MEDICARE

## 2023-11-10 NOTE — PRE-PROCEDURE INSTRUCTIONS
Pre-Surgery Instructions:   Medication Instructions    estradiol (ESTRACE) 0.1 mg/g vaginal cream Hold evening prior    losartan-hydrochlorothiazide (HYZAAR) 100-12.5 MG per tablet Hold day of surgery. Magnesium Citrate 100 MG TABS Stop taking 7 days prior to surgery. naproxen sodium (ALEVE) 220 MG tablet Stop taking 7 days prior to surgery. Omega-3 Fatty Acids (Fish Oil) 645 MG CAPS Stop taking 7 days prior to surgery. VITAMIN D PO Stop taking 7 days prior to surgery. Medication instructions for day surgery reviewed. Please use only a sip of water to take your instructed medications. Avoid all over the counter vitamins, supplements and NSAIDS for one week prior to surgery per anesthesia guidelines. Tylenol is ok to take as needed. You will receive a call one business day prior to surgery with an arrival time and hospital directions. If your surgery is scheduled on a Monday, the hospital will be calling you on the Friday prior to your surgery. If you have not heard from anyone by 8pm, please call the hospital supervisor through the hospital  at [phone]. Webster Pace [phone]). Do not eat or drink anything after midnight the night before your surgery, including candy, mints, lifesavers, or chewing gum. Do not drink alcohol 24hrs before your surgery. Try not to smoke at least 24hrs before your surgery. Follow the pre surgery showering instructions as listed in the [facility] Surgical Experience Booklet” or otherwise provided by your surgeon's office. Do not use a blade to shave the surgical area 1 week before surgery. It is okay to use a clean electric clippers up to 24 hours before surgery. Do not apply any lotions, creams, including makeup, cologne, deodorant, or perfumes after showering on the day of your surgery. Do not use dry shampoo, hair spray, hair gel, or any type of hair products. No contact lenses, eye make-up, or artificial eyelashes.  Remove nail polish, including gel polish, and any artificial, gel, or acrylic nails if possible. Remove all jewelry including rings and body piercing jewelry. Wear causal clothing that is easy to take on and off. Consider your type of surgery. Keep any valuables, jewelry, piercings at home. Please bring any specially ordered equipment (sling, braces) if indicated. Arrange for a responsible person to drive you to and from the hospital on the day of your surgery. Visitor Guidelines discussed. Call the surgeon's office with any new illnesses, exposures, or additional questions prior to surgery. Please reference your [facility] Surgical Experience Booklet” for additional information to prepare for your upcoming surgery.

## 2023-11-13 ENCOUNTER — ANESTHESIA (OUTPATIENT)
Dept: PERIOP | Facility: HOSPITAL | Age: 78
End: 2023-11-13
Payer: MEDICARE

## 2023-11-13 ENCOUNTER — HOSPITAL ENCOUNTER (OUTPATIENT)
Facility: HOSPITAL | Age: 78
Setting detail: OUTPATIENT SURGERY
Discharge: HOME | End: 2023-11-13
Attending: OBSTETRICS & GYNECOLOGY | Admitting: OBSTETRICS & GYNECOLOGY
Payer: MEDICARE

## 2023-11-13 VITALS
SYSTOLIC BLOOD PRESSURE: 152 MMHG | HEART RATE: 64 BPM | BODY MASS INDEX: 25.64 KG/M2 | OXYGEN SATURATION: 96 % | WEIGHT: 168.65 LBS | RESPIRATION RATE: 18 BRPM | DIASTOLIC BLOOD PRESSURE: 69 MMHG | TEMPERATURE: 96.7 F

## 2023-11-13 DIAGNOSIS — N93.9 ABNORMAL UTERINE BLEEDING (AUB): ICD-10-CM

## 2023-11-13 DIAGNOSIS — N95.0 PMB (POSTMENOPAUSAL BLEEDING): ICD-10-CM

## 2023-11-13 PROBLEM — I10 HTN (HYPERTENSION): Status: ACTIVE | Noted: 2023-11-13

## 2023-11-13 PROCEDURE — 58555 HYSTEROSCOPY DX SEP PROC: CPT | Performed by: OBSTETRICS & GYNECOLOGY

## 2023-11-13 RX ORDER — ONDANSETRON 2 MG/ML
4 INJECTION INTRAMUSCULAR; INTRAVENOUS ONCE AS NEEDED
Status: DISCONTINUED | OUTPATIENT
Start: 2023-11-13 | End: 2023-11-13 | Stop reason: HOSPADM

## 2023-11-13 RX ORDER — ONDANSETRON 2 MG/ML
INJECTION INTRAMUSCULAR; INTRAVENOUS AS NEEDED
Status: DISCONTINUED | OUTPATIENT
Start: 2023-11-13 | End: 2023-11-13

## 2023-11-13 RX ORDER — PROPOFOL 10 MG/ML
INJECTION, EMULSION INTRAVENOUS AS NEEDED
Status: DISCONTINUED | OUTPATIENT
Start: 2023-11-13 | End: 2023-11-13

## 2023-11-13 RX ORDER — OXYCODONE HYDROCHLORIDE 5 MG/1
5 TABLET ORAL EVERY 4 HOURS PRN
Status: DISCONTINUED | OUTPATIENT
Start: 2023-11-13 | End: 2023-11-13 | Stop reason: HOSPADM

## 2023-11-13 RX ORDER — ACETAMINOPHEN 325 MG/1
975 TABLET ORAL EVERY 6 HOURS PRN
Refills: 0
Start: 2023-11-13

## 2023-11-13 RX ORDER — FENTANYL CITRATE/PF 50 MCG/ML
25 SYRINGE (ML) INJECTION
Status: DISCONTINUED | OUTPATIENT
Start: 2023-11-13 | End: 2023-11-13 | Stop reason: HOSPADM

## 2023-11-13 RX ORDER — DEXAMETHASONE SODIUM PHOSPHATE 10 MG/ML
INJECTION, SOLUTION INTRAMUSCULAR; INTRAVENOUS AS NEEDED
Status: DISCONTINUED | OUTPATIENT
Start: 2023-11-13 | End: 2023-11-13

## 2023-11-13 RX ORDER — FENTANYL CITRATE 50 UG/ML
INJECTION, SOLUTION INTRAMUSCULAR; INTRAVENOUS AS NEEDED
Status: DISCONTINUED | OUTPATIENT
Start: 2023-11-13 | End: 2023-11-13

## 2023-11-13 RX ORDER — EPHEDRINE SULFATE 50 MG/ML
INJECTION INTRAVENOUS AS NEEDED
Status: DISCONTINUED | OUTPATIENT
Start: 2023-11-13 | End: 2023-11-13

## 2023-11-13 RX ORDER — ONDANSETRON 2 MG/ML
4 INJECTION INTRAMUSCULAR; INTRAVENOUS EVERY 6 HOURS PRN
Status: DISCONTINUED | OUTPATIENT
Start: 2023-11-13 | End: 2023-11-13 | Stop reason: HOSPADM

## 2023-11-13 RX ORDER — IBUPROFEN 600 MG/1
600 TABLET ORAL EVERY 6 HOURS PRN
Status: DISCONTINUED | OUTPATIENT
Start: 2023-11-13 | End: 2023-11-13 | Stop reason: HOSPADM

## 2023-11-13 RX ORDER — ACETAMINOPHEN 325 MG/1
975 TABLET ORAL ONCE
Status: COMPLETED | OUTPATIENT
Start: 2023-11-13 | End: 2023-11-13

## 2023-11-13 RX ORDER — LIDOCAINE HYDROCHLORIDE 10 MG/ML
INJECTION, SOLUTION EPIDURAL; INFILTRATION; INTRACAUDAL; PERINEURAL AS NEEDED
Status: DISCONTINUED | OUTPATIENT
Start: 2023-11-13 | End: 2023-11-13

## 2023-11-13 RX ORDER — MAGNESIUM HYDROXIDE 1200 MG/15ML
LIQUID ORAL AS NEEDED
Status: DISCONTINUED | OUTPATIENT
Start: 2023-11-13 | End: 2023-11-13 | Stop reason: HOSPADM

## 2023-11-13 RX ORDER — ACETAMINOPHEN 325 MG/1
975 TABLET ORAL EVERY 6 HOURS PRN
Status: DISCONTINUED | OUTPATIENT
Start: 2023-11-13 | End: 2023-11-13 | Stop reason: HOSPADM

## 2023-11-13 RX ORDER — SODIUM CHLORIDE, SODIUM LACTATE, POTASSIUM CHLORIDE, CALCIUM CHLORIDE 600; 310; 30; 20 MG/100ML; MG/100ML; MG/100ML; MG/100ML
125 INJECTION, SOLUTION INTRAVENOUS CONTINUOUS
Status: DISCONTINUED | OUTPATIENT
Start: 2023-11-13 | End: 2023-11-13 | Stop reason: HOSPADM

## 2023-11-13 RX ADMIN — FENTANYL CITRATE 50 MCG: 50 INJECTION INTRAMUSCULAR; INTRAVENOUS at 11:38

## 2023-11-13 RX ADMIN — DEXAMETHASONE SODIUM PHOSPHATE 10 MG: 10 INJECTION INTRAMUSCULAR; INTRAVENOUS at 11:25

## 2023-11-13 RX ADMIN — PROPOFOL 200 MG: 10 INJECTION, EMULSION INTRAVENOUS at 11:18

## 2023-11-13 RX ADMIN — FENTANYL CITRATE 50 MCG: 50 INJECTION INTRAMUSCULAR; INTRAVENOUS at 11:20

## 2023-11-13 RX ADMIN — LIDOCAINE HYDROCHLORIDE 80 MG: 10 INJECTION, SOLUTION EPIDURAL; INFILTRATION; INTRACAUDAL; PERINEURAL at 11:18

## 2023-11-13 RX ADMIN — ONDANSETRON 4 MG: 2 INJECTION INTRAMUSCULAR; INTRAVENOUS at 11:46

## 2023-11-13 RX ADMIN — EPHEDRINE SULFATE 5 MG: 50 INJECTION, SOLUTION INTRAVENOUS at 11:30

## 2023-11-13 RX ADMIN — SODIUM CHLORIDE, SODIUM LACTATE, POTASSIUM CHLORIDE, AND CALCIUM CHLORIDE 125 ML/HR: .6; .31; .03; .02 INJECTION, SOLUTION INTRAVENOUS at 10:08

## 2023-11-13 RX ADMIN — ACETAMINOPHEN 325MG 975 MG: 325 TABLET ORAL at 10:02

## 2023-11-13 NOTE — ANESTHESIA PREPROCEDURE EVALUATION
Procedure:  (D&C) W/ HYSTEROSCOPY W/ POLYPECTOMY (Uterus)    Relevant Problems   ANESTHESIA (within normal limits)      CARDIO   (+) HTN (hypertension)      ENDO (within normal limits)      GI/HEPATIC (within normal limits)      /RENAL (within normal limits)      GYN (within normal limits)      HEMATOLOGY (within normal limits)      MUSCULOSKELETAL (within normal limits)      NEURO/PSYCH (within normal limits)      PULMONARY (within normal limits)        Physical Exam    Airway    Mallampati score: III  TM Distance: >3 FB  Neck ROM: full     Dental   No notable dental hx     Cardiovascular  Rhythm: regular, Rate: normal, Cardiovascular exam normal    Pulmonary  Pulmonary exam normal Breath sounds clear to auscultation    Other Findings        Anesthesia Plan  ASA Score- 2     Anesthesia Type- general with ASA Monitors. Additional Monitors:     Airway Plan: LMA. Plan Factors-Exercise tolerance (METS): >4 METS. Chart reviewed. Existing labs reviewed. Patient summary reviewed. Patient is not a current smoker. Induction- intravenous. Postoperative Plan- Plan for postoperative opioid use. Informed Consent- Anesthetic plan and risks discussed with patient.

## 2023-11-13 NOTE — OP NOTE
OPERATIVE REPORT  PATIENT NAME: Efra Mooney    :  1945  MRN: [id number]  Pt Location: [facility] OR ROOM 01    SURGERY DATE: 2023    Surgeon(s) and Role:     Cece Aburto DO - Primary     * Soumya Vega MD - Assisting    Preop Diagnosis:  Abnormal uterine bleeding (AUB) [N93.9]  PMB (postmenopausal bleeding) [N95.0]    Post-Op Diagnosis Codes:     * Abnormal uterine bleeding (AUB) [N93.9]     * PMB (postmenopausal bleeding) [N95.0]    Procedure(s):  Hysteroscopy    Specimen(s):  None    Estimated Blood Loss:   10 mL    Drains:  None    Anesthesia Type:   General    Operative Indications:  Abnormal uterine bleeding (AUB) [N93.9]  PMB (postmenopausal bleeding) [N95.0]    Operative Findings:  1. External genitalia grossly normal in appearance. No ulcerations, no lacerations, no lesions. Bimanual exam revealed retroverted uterus with normal contours and freely mobile. No adnexal masses palpated bilaterally. 2.  Vagina grossly normal in appearance without any lacerations or lesions. 3. Cervix multiparous appearing with stenotic cervical os  4. Hysteroscopic examination revealed thin atrophic endometrial lining without polyp or fibroid. False cervical passage visualized without extension into abdomen. 5. Fluid deficit was 50 mL     Complications:   None    Procedure and Technique:  The patient was taken to the operating room where a time out was performed to confirm correct patient and correct procedure. General LMA anesthesia (LMA) was administered and the patient was positioned on the OR table in the dorsal lithotomy position. All pressure points were padded and warm blankets x3 were placed to maintain control of core body temperature. A bimanual exam was performed and the uterus was noted to be retroverted, normal in size and consistency with no palpable adnexal masses or fullness. The patient was prepped and draped in the usual sterile fashion.      A weighted speculum was inserted into the vagina and a Suman retractor was used to visualize the anterior lip of the cervix, which was then grasped with a single toothed tenaculum. The cervical os was noted to be stenosed and passage of the Alok dilators was not possible. A hemostat was used to manually dilate and separate the adhesions in the external cervical os, and the passage was dilated to 13Fr. Upon introduction of the hysteroscope it was noted that a false passage was created which extended into the cervix with no evidence complete perforation. The hysteroscope was remove. A 11 blade scalpel was then used to make an X incision over the cervical os and then dilators were then passed with ease through the true cervical os. The cervix was serially dilated to 13Fr dilators for introduction of the hysteroscope. Hysteroscope was introduced under direct visualization using normal saline solution as the distention media. Hysteroscope was advanced to the uterine fundus and the entire uterine cavity was inspected in a systematic manner. There was noted to be thin and atrophic vaginal tissue with no evidence of proliferative endometrium or polyp. The hysteroscope was withdrawn. The single toothed tenaculum was removed from the anterior lip of the cervix. Good hemostasis was confirmed at the tenaculum puncture sites. Weighted speculum was then removed from the vagina. At the conclusion of the procedure, all needle, sponge, and instrument counts were noted to be correct x2. Dr. Cesar Sloan was present and participated in all key portions of the case.      Patient Disposition:  PACU         SIGNATURE: Sammie Kendall MD  DATE: November 13, 2023  TIME: 11:55 AM

## 2023-11-13 NOTE — ANESTHESIA POSTPROCEDURE EVALUATION
Post-Op Assessment Note    CV Status:  Stable    Pain management: adequate     Mental Status:  Awake   Hydration Status:  Stable   PONV Controlled:  Controlled   Airway Patency:  Patent      Post Op Vitals Reviewed: Yes      Staff: Anesthesiologist         No notable events documented.     /73 (11/13/23 1247)    Temp     Pulse 62 (11/13/23 1247)   Resp 20 (11/13/23 1247)    SpO2 99 % (11/13/23 1247)

## 2023-11-13 NOTE — INTERVAL H&P NOTE
H&P reviewed. After examining the patient I find no changes in the patients condition since the H&P had been written.     Vitals:    11/13/23 0954   BP: 150/71   Pulse: 60   Resp: 16   Temp: 97.7 °F (36.5 °C)   SpO2: 97%

## 2023-12-01 ENCOUNTER — OFFICE VISIT (OUTPATIENT)
Dept: GYNECOLOGY | Facility: CLINIC | Age: 78
End: 2023-12-01

## 2023-12-01 VITALS
HEIGHT: 68 IN | DIASTOLIC BLOOD PRESSURE: 68 MMHG | SYSTOLIC BLOOD PRESSURE: 138 MMHG | BODY MASS INDEX: 25.46 KG/M2 | WEIGHT: 168 LBS

## 2023-12-01 DIAGNOSIS — N32.81 OAB (OVERACTIVE BLADDER): ICD-10-CM

## 2023-12-01 DIAGNOSIS — Z48.89 POSTOPERATIVE VISIT: Primary | ICD-10-CM

## 2023-12-01 PROCEDURE — 99024 POSTOP FOLLOW-UP VISIT: CPT | Performed by: OBSTETRICS & GYNECOLOGY

## 2023-12-01 NOTE — PROGRESS NOTES
Patient presents for postoperative check. She is status post hysteroscopy D&C. At the time of the surgery there was noted to be complete atrophy of the endometrial cavity therefore no polypectomy or D&C was performed. She is doing well since the surgery with no complaints. She does present complaining of some urgency and urgency incontinence. Denies any stress urinary incontinence. No dysuria or hematuria    Physical exam: Uterus is anteverted normal size and contour freely mobile and nontender. No palpable adnexal masses or tenderness. Cervix is closed. Impression: Postoperative check/urgency/urgency incontinence    Plan: Discussed medical management for overactive bladder. At this point patient opts to follow.

## 2024-01-29 DIAGNOSIS — N95.2 ATROPHY OF VAGINA: ICD-10-CM

## 2024-01-29 RX ORDER — ESTRADIOL 0.1 MG/G
CREAM VAGINAL
Qty: 42.5 G | Refills: 5 | Status: SHIPPED | OUTPATIENT
Start: 2024-01-29

## 2024-04-11 ENCOUNTER — APPOINTMENT (OUTPATIENT)
Dept: LAB | Facility: MEDICAL CENTER | Age: 79
End: 2024-04-11
Payer: MEDICARE

## 2024-04-11 DIAGNOSIS — I10 ESSENTIAL HYPERTENSION, MALIGNANT: ICD-10-CM

## 2024-04-11 DIAGNOSIS — E78.2 MIXED HYPERLIPIDEMIA: ICD-10-CM

## 2024-04-11 LAB
ALBUMIN SERPL BCP-MCNC: 4 G/DL (ref 3.5–5)
ALP SERPL-CCNC: 76 U/L (ref 34–104)
ALT SERPL W P-5'-P-CCNC: 14 U/L (ref 7–52)
ANION GAP SERPL CALCULATED.3IONS-SCNC: 7 MMOL/L (ref 4–13)
AST SERPL W P-5'-P-CCNC: 27 U/L (ref 13–39)
BASOPHILS # BLD AUTO: 0.03 THOUSANDS/ÂΜL (ref 0–0.1)
BASOPHILS NFR BLD AUTO: 1 % (ref 0–1)
BILIRUB SERPL-MCNC: 0.47 MG/DL (ref 0.2–1)
BUN SERPL-MCNC: 29 MG/DL (ref 5–25)
CALCIUM SERPL-MCNC: 9 MG/DL (ref 8.4–10.2)
CHLORIDE SERPL-SCNC: 106 MMOL/L (ref 96–108)
CHOLEST SERPL-MCNC: 256 MG/DL
CO2 SERPL-SCNC: 29 MMOL/L (ref 21–32)
CREAT SERPL-MCNC: 0.76 MG/DL (ref 0.6–1.3)
CREAT UR-MCNC: 120 MG/DL
EOSINOPHIL # BLD AUTO: 0.25 THOUSAND/ÂΜL (ref 0–0.61)
EOSINOPHIL NFR BLD AUTO: 6 % (ref 0–6)
ERYTHROCYTE [DISTWIDTH] IN BLOOD BY AUTOMATED COUNT: 13.6 % (ref 11.6–15.1)
GFR SERPL CREATININE-BSD FRML MDRD: 74 ML/MIN/1.73SQ M
GLUCOSE P FAST SERPL-MCNC: 79 MG/DL (ref 65–99)
HCT VFR BLD AUTO: 40.3 % (ref 34.8–46.1)
HDLC SERPL-MCNC: 95 MG/DL
HGB BLD-MCNC: 13.2 G/DL (ref 11.5–15.4)
IMM GRANULOCYTES # BLD AUTO: 0 THOUSAND/UL (ref 0–0.2)
IMM GRANULOCYTES NFR BLD AUTO: 0 % (ref 0–2)
LDLC SERPL CALC-MCNC: 152 MG/DL (ref 0–100)
LYMPHOCYTES # BLD AUTO: 1.31 THOUSANDS/ÂΜL (ref 0.6–4.47)
LYMPHOCYTES NFR BLD AUTO: 30 % (ref 14–44)
MCH RBC QN AUTO: 30.8 PG (ref 26.8–34.3)
MCHC RBC AUTO-ENTMCNC: 32.8 G/DL (ref 31.4–37.4)
MCV RBC AUTO: 94 FL (ref 82–98)
MICROALBUMIN UR-MCNC: 8.3 MG/L
MICROALBUMIN/CREAT 24H UR: 7 MG/G CREATININE (ref 0–30)
MONOCYTES # BLD AUTO: 0.55 THOUSAND/ÂΜL (ref 0.17–1.22)
MONOCYTES NFR BLD AUTO: 12 % (ref 4–12)
NEUTROPHILS # BLD AUTO: 2.29 THOUSANDS/ÂΜL (ref 1.85–7.62)
NEUTS SEG NFR BLD AUTO: 51 % (ref 43–75)
NONHDLC SERPL-MCNC: 161 MG/DL
NRBC BLD AUTO-RTO: 0 /100 WBCS
PLATELET # BLD AUTO: 261 THOUSANDS/UL (ref 149–390)
PMV BLD AUTO: 11.1 FL (ref 8.9–12.7)
POTASSIUM SERPL-SCNC: 3.8 MMOL/L (ref 3.5–5.3)
PROT SERPL-MCNC: 6.6 G/DL (ref 6.4–8.4)
RBC # BLD AUTO: 4.28 MILLION/UL (ref 3.81–5.12)
SODIUM SERPL-SCNC: 142 MMOL/L (ref 135–147)
TRIGL SERPL-MCNC: 44 MG/DL
TSH SERPL DL<=0.05 MIU/L-ACNC: 2.53 UIU/ML (ref 0.45–4.5)
WBC # BLD AUTO: 4.43 THOUSAND/UL (ref 4.31–10.16)

## 2024-04-11 PROCEDURE — 84443 ASSAY THYROID STIM HORMONE: CPT

## 2024-04-11 PROCEDURE — 82043 UR ALBUMIN QUANTITATIVE: CPT

## 2024-04-11 PROCEDURE — 82570 ASSAY OF URINE CREATININE: CPT

## 2024-04-11 PROCEDURE — 80053 COMPREHEN METABOLIC PANEL: CPT

## 2024-04-11 PROCEDURE — 36415 COLL VENOUS BLD VENIPUNCTURE: CPT

## 2024-04-11 PROCEDURE — 85025 COMPLETE CBC W/AUTO DIFF WBC: CPT

## 2024-04-11 PROCEDURE — 80061 LIPID PANEL: CPT

## 2024-06-04 DIAGNOSIS — Z12.31 ENCOUNTER FOR SCREENING MAMMOGRAM FOR MALIGNANT NEOPLASM OF BREAST: Primary | ICD-10-CM

## 2024-08-01 ENCOUNTER — HOSPITAL ENCOUNTER (OUTPATIENT)
Dept: MAMMOGRAPHY | Facility: MEDICAL CENTER | Age: 79
Discharge: HOME | End: 2024-08-01
Payer: MEDICARE

## 2024-08-01 VITALS — BODY MASS INDEX: 25.46 KG/M2 | WEIGHT: 167.99 LBS | HEIGHT: 68 IN

## 2024-08-01 DIAGNOSIS — Z12.31 ENCOUNTER FOR SCREENING MAMMOGRAM FOR MALIGNANT NEOPLASM OF BREAST: ICD-10-CM

## 2024-08-01 PROCEDURE — 77063 BREAST TOMOSYNTHESIS BI: CPT

## 2024-08-01 PROCEDURE — 77067 SCR MAMMO BI INCL CAD: CPT

## 2024-08-08 ENCOUNTER — APPOINTMENT (OUTPATIENT)
Dept: LAB | Facility: MEDICAL CENTER | Age: 79
End: 2024-08-08
Payer: MEDICARE

## 2024-08-08 ENCOUNTER — APPOINTMENT (OUTPATIENT)
Dept: RADIOLOGY | Facility: MEDICAL CENTER | Age: 79
End: 2024-08-08
Payer: MEDICARE

## 2024-08-08 DIAGNOSIS — E78.5 HYPERLIPIDEMIA, UNSPECIFIED HYPERLIPIDEMIA TYPE: ICD-10-CM

## 2024-08-08 DIAGNOSIS — I10 ESSENTIAL HYPERTENSION, MALIGNANT: ICD-10-CM

## 2024-08-08 DIAGNOSIS — M25.561 RIGHT KNEE PAIN, UNSPECIFIED CHRONICITY: ICD-10-CM

## 2024-08-08 LAB
ALBUMIN SERPL BCG-MCNC: 3.8 G/DL (ref 3.5–5)
ALP SERPL-CCNC: 56 U/L (ref 34–104)
ALT SERPL W P-5'-P-CCNC: 14 U/L (ref 7–52)
ANION GAP SERPL CALCULATED.3IONS-SCNC: 9 MMOL/L (ref 4–13)
AST SERPL W P-5'-P-CCNC: 26 U/L (ref 13–39)
BASOPHILS # BLD AUTO: 0.03 THOUSANDS/ÂΜL (ref 0–0.1)
BASOPHILS NFR BLD AUTO: 1 % (ref 0–1)
BILIRUB SERPL-MCNC: 0.65 MG/DL (ref 0.2–1)
BUN SERPL-MCNC: 27 MG/DL (ref 5–25)
CALCIUM SERPL-MCNC: 9.4 MG/DL (ref 8.4–10.2)
CHLORIDE SERPL-SCNC: 103 MMOL/L (ref 96–108)
CHOLEST SERPL-MCNC: 255 MG/DL
CO2 SERPL-SCNC: 30 MMOL/L (ref 21–32)
CREAT SERPL-MCNC: 0.95 MG/DL (ref 0.6–1.3)
CREAT UR-MCNC: 130.4 MG/DL
EOSINOPHIL # BLD AUTO: 0.22 THOUSAND/ÂΜL (ref 0–0.61)
EOSINOPHIL NFR BLD AUTO: 4 % (ref 0–6)
ERYTHROCYTE [DISTWIDTH] IN BLOOD BY AUTOMATED COUNT: 14.2 % (ref 11.6–15.1)
GFR SERPL CREATININE-BSD FRML MDRD: 56 ML/MIN/1.73SQ M
GLUCOSE P FAST SERPL-MCNC: 77 MG/DL (ref 65–99)
HCT VFR BLD AUTO: 39.5 % (ref 34.8–46.1)
HDLC SERPL-MCNC: 104 MG/DL
HGB BLD-MCNC: 12.8 G/DL (ref 11.5–15.4)
IMM GRANULOCYTES # BLD AUTO: 0.01 THOUSAND/UL (ref 0–0.2)
IMM GRANULOCYTES NFR BLD AUTO: 0 % (ref 0–2)
LDLC SERPL CALC-MCNC: 138 MG/DL (ref 0–100)
LYMPHOCYTES # BLD AUTO: 1.3 THOUSANDS/ÂΜL (ref 0.6–4.47)
LYMPHOCYTES NFR BLD AUTO: 23 % (ref 14–44)
MCH RBC QN AUTO: 30.5 PG (ref 26.8–34.3)
MCHC RBC AUTO-ENTMCNC: 32.4 G/DL (ref 31.4–37.4)
MCV RBC AUTO: 94 FL (ref 82–98)
MICROALBUMIN UR-MCNC: 8.8 MG/L
MICROALBUMIN/CREAT 24H UR: 7 MG/G CREATININE (ref 0–30)
MONOCYTES # BLD AUTO: 0.53 THOUSAND/ÂΜL (ref 0.17–1.22)
MONOCYTES NFR BLD AUTO: 9 % (ref 4–12)
NEUTROPHILS # BLD AUTO: 3.6 THOUSANDS/ÂΜL (ref 1.85–7.62)
NEUTS SEG NFR BLD AUTO: 63 % (ref 43–75)
NONHDLC SERPL-MCNC: 151 MG/DL
NRBC BLD AUTO-RTO: 0 /100 WBCS
PLATELET # BLD AUTO: 256 THOUSANDS/UL (ref 149–390)
PMV BLD AUTO: 10.7 FL (ref 8.9–12.7)
POTASSIUM SERPL-SCNC: 4.1 MMOL/L (ref 3.5–5.3)
PROT SERPL-MCNC: 6.9 G/DL (ref 6.4–8.4)
RBC # BLD AUTO: 4.19 MILLION/UL (ref 3.81–5.12)
SODIUM SERPL-SCNC: 142 MMOL/L (ref 135–147)
TRIGL SERPL-MCNC: 65 MG/DL
TSH SERPL DL<=0.05 MIU/L-ACNC: 2.32 UIU/ML (ref 0.45–4.5)
WBC # BLD AUTO: 5.69 THOUSAND/UL (ref 4.31–10.16)

## 2024-08-08 PROCEDURE — 85025 COMPLETE CBC W/AUTO DIFF WBC: CPT

## 2024-08-08 PROCEDURE — 36415 COLL VENOUS BLD VENIPUNCTURE: CPT

## 2024-08-08 PROCEDURE — 82570 ASSAY OF URINE CREATININE: CPT

## 2024-08-08 PROCEDURE — 84443 ASSAY THYROID STIM HORMONE: CPT

## 2024-08-08 PROCEDURE — 82043 UR ALBUMIN QUANTITATIVE: CPT

## 2024-08-08 PROCEDURE — 73562 X-RAY EXAM OF KNEE 3: CPT

## 2024-08-08 PROCEDURE — 80061 LIPID PANEL: CPT

## 2024-08-08 PROCEDURE — 80053 COMPREHEN METABOLIC PANEL: CPT

## 2024-09-10 ENCOUNTER — ANNUAL EXAM (OUTPATIENT)
Dept: GYNECOLOGY | Facility: CLINIC | Age: 79
End: 2024-09-10
Payer: MEDICARE

## 2024-09-10 VITALS
HEIGHT: 68 IN | DIASTOLIC BLOOD PRESSURE: 70 MMHG | HEART RATE: 58 BPM | BODY MASS INDEX: 25.54 KG/M2 | SYSTOLIC BLOOD PRESSURE: 128 MMHG

## 2024-09-10 DIAGNOSIS — R32 INCONTINENCE IN FEMALE: ICD-10-CM

## 2024-09-10 DIAGNOSIS — Z12.31 ENCOUNTER FOR SCREENING MAMMOGRAM FOR MALIGNANT NEOPLASM OF BREAST: ICD-10-CM

## 2024-09-10 DIAGNOSIS — Z78.0 MENOPAUSE: ICD-10-CM

## 2024-09-10 DIAGNOSIS — N32.81 OAB (OVERACTIVE BLADDER): ICD-10-CM

## 2024-09-10 DIAGNOSIS — Z13.820 ENCOUNTER FOR SCREENING FOR OSTEOPOROSIS: ICD-10-CM

## 2024-09-10 DIAGNOSIS — Z01.419 ENCOUNTER FOR GYNECOLOGICAL EXAMINATION WITHOUT ABNORMAL FINDING: Primary | ICD-10-CM

## 2024-09-10 PROCEDURE — G0145 SCR C/V CYTO,THINLAYER,RESCR: HCPCS | Performed by: OBSTETRICS & GYNECOLOGY

## 2024-09-10 PROCEDURE — G0101 CA SCREEN;PELVIC/BREAST EXAM: HCPCS | Performed by: OBSTETRICS & GYNECOLOGY

## 2024-09-10 NOTE — PROGRESS NOTES
Ambulatory Visit  Name: Marcy oTdd      : 1945      MRN: [id number]  Encounter Provider: Nguyễn Downing DO  Encounter Date: 9/10/2024   Encounter department: [facility]    Assessment & Plan  Encounter for screening mammogram for malignant neoplasm of breast    Orders:    Mammo screening bilateral w 3d and cad; Future    Encounter for screening for osteoporosis    Orders:    DXA bone density spine hip and pelvis; Future    Menopause    Orders:    DXA bone density spine hip and pelvis; Future    Encounter for gynecological examination without abnormal finding    Orders:    Liquid-based pap, screening    OAB (overactive bladder)         Incontinence in female       Referral placed to uro-GYN    History of Present Illness     Marcy Todd is a 79 y.o. female who presents for GYN exam.  Patient denies any vaginal irritation, burning, discharge or bleeding.  She presents complaining of increased frequency of urination with urgency with episodes of urinary incontinence without sensory awareness, occasional stress urinary incontinence.  Denies any dysuria or hematuria.  No GI complaints.  Patient is status post D&C in 2023 secondary to postmenopausal bleeding.  She has had no further bleeding since then.    DEXA scan 2022.      Review of Systems   Constitutional: Negative.    HENT:  Negative for sore throat and trouble swallowing.    Gastrointestinal: Negative.    Genitourinary:  Positive for frequency and urgency. Negative for difficulty urinating, dysuria, hematuria, vaginal bleeding and vaginal discharge.     Past Medical History   Past Medical History:   Diagnosis Date    Arthritis     Bulging lumbar disc     Cancer (HCC)     skin squamous    Chronic pain disorder     Back    Environmental allergies     Fibroid     Hypertension     Postmenopausal bleeding     Urinary incontinence      Past Surgical History:   Procedure Laterality Date     BLADDER SUSPENSION      CATARACT EXTRACTION      COLONOSCOPY      DILATION AND CURETTAGE OF UTERUS      FRACTURE SURGERY      Ankle    LUMBAR EPIDURAL INJECTION      KS HYSTEROSCOPY BX ENDOMETRIUM&/POLYPC W/WO D&C N/A 11/13/2023    Procedure: (D&C) W/ HYSTEROSCOPY W/ POLYPECTOMY;  Surgeon: Nguyễn Downing DO;  Location: [facility];  Service: Gynecology    SQUAMOUS CELL CARCINOMA EXCISION      TUBAL LIGATION       Family History   Problem Relation Age of Onset    Colon cancer Father 66    Lung cancer Father 68    Cancer Father         multiple    Breast cancer Maternal Aunt 63    Osteoporosis Mother     No Known Problems Sister     No Known Problems Daughter     No Known Problems Maternal Grandmother     No Known Problems Maternal Grandfather     No Known Problems Paternal Grandmother     No Known Problems Paternal Grandfather     No Known Problems Daughter      Current Outpatient Medications on File Prior to Visit   Medication Sig Dispense Refill    acetaminophen (TYLENOL) 325 mg tablet Take 3 tablets (975 mg total) by mouth every 6 (six) hours as needed for mild pain  0    CALCIUM PO Take by mouth      estradiol (ESTRACE) 0.1 mg/g vaginal cream INSERT 1 GRAM VAGINALLY TWICE A WEEK AS MAINTENCE DOSE 42.5 g 5    losartan-hydrochlorothiazide (HYZAAR) 100-12.5 MG per tablet Take by mouth daily      Magnesium Citrate 100 MG TABS Take 200 mg by mouth      naproxen sodium (ALEVE) 220 MG tablet Take 220 mg by mouth every 12 (twelve) hours as needed      Omega-3 Fatty Acids (Fish Oil) 645 MG CAPS       VITAMIN D PO Take by mouth      Flaxseed, Linseed, (Flaxseed Oil) 1200 MG CAPS Flaxseed Oil CAPS   Refills: 0      , M.D.; Active      losartan-hydrochlorothiazide (HYZAAR) 50-12.5 mg per tablet       Lutein 20 MG TABS Take 20 mg by mouth daily      Lutein 20 MG TABS Take by mouth (Patient not taking: Reported on 8/18/2022)      Omega-3 Fatty Acids (FISH OIL) 645 MG CAPS Take by mouth       No current facility-administered  "medications on file prior to visit.     Allergies   Allergen Reactions    Penicillins Facial Swelling and Other (See Comments)    Pollen Extract Itching and Other (See Comments)      Current Outpatient Medications on File Prior to Visit   Medication Sig Dispense Refill    acetaminophen (TYLENOL) 325 mg tablet Take 3 tablets (975 mg total) by mouth every 6 (six) hours as needed for mild pain  0    CALCIUM PO Take by mouth      estradiol (ESTRACE) 0.1 mg/g vaginal cream INSERT 1 GRAM VAGINALLY TWICE A WEEK AS MAINTENCE DOSE 42.5 g 5    losartan-hydrochlorothiazide (HYZAAR) 100-12.5 MG per tablet Take by mouth daily      Magnesium Citrate 100 MG TABS Take 200 mg by mouth      naproxen sodium (ALEVE) 220 MG tablet Take 220 mg by mouth every 12 (twelve) hours as needed      Omega-3 Fatty Acids (Fish Oil) 645 MG CAPS       VITAMIN D PO Take by mouth      Flaxseed, Linseed, (Flaxseed Oil) 1200 MG CAPS Flaxseed Oil CAPS   Refills: 0      , M.D.; Active      losartan-hydrochlorothiazide (HYZAAR) 50-12.5 mg per tablet       Lutein 20 MG TABS Take 20 mg by mouth daily      Lutein 20 MG TABS Take by mouth (Patient not taking: Reported on 8/18/2022)      Omega-3 Fatty Acids (FISH OIL) 645 MG CAPS Take by mouth       No current facility-administered medications on file prior to visit.      Social History     Tobacco Use    Smoking status: Never    Smokeless tobacco: Never   Vaping Use    Vaping status: Never Used   Substance and Sexual Activity    Alcohol use: Yes     Comment: Rarely    Drug use: Never    Sexual activity: Not Currently     Partners: Male     Birth control/protection: Post-menopausal         Objective     /70   Pulse 58   Ht 5' 8\" (1.727 m)   BMI 25.54 kg/m²     Physical Exam  Vitals reviewed.   Constitutional:       Appearance: Normal appearance.   Cardiovascular:      Rate and Rhythm: Normal rate and regular rhythm.      Pulses: Normal pulses.      Heart sounds: Normal heart sounds. No murmur " heard.  Pulmonary:      Effort: Pulmonary effort is normal. No respiratory distress.      Breath sounds: Normal breath sounds.   Chest:   Breasts:     Right: No swelling, bleeding, inverted nipple, mass, nipple discharge, skin change or tenderness.      Left: No swelling, bleeding, inverted nipple, mass, nipple discharge, skin change or tenderness.   Abdominal:      General: There is no distension.      Palpations: Abdomen is soft. There is no mass.      Tenderness: There is no abdominal tenderness. There is no guarding or rebound.      Hernia: No hernia is present. There is no hernia in the left inguinal area or right inguinal area.   Genitourinary:     General: Normal vulva.      Labia:         Right: No rash, tenderness or lesion.         Left: No rash, tenderness or lesion.       Vagina: Normal.      Comments: Uterus anteverted normal size and contour freely mobile and nontender.  No palpable adnexal masses or tenderness.  Cervix appears normal.  Vagina with atrophic changes.  [place]'s and [place]'s glands normal.  Urethral orifice normal.  No cystocele or rectocele.  Musculoskeletal:      Cervical back: Normal range of motion and neck supple. No tenderness.   Lymphadenopathy:      Cervical: No cervical adenopathy.      Upper Body:      Right upper body: No supraclavicular, axillary or pectoral adenopathy.      Left upper body: No supraclavicular, axillary or pectoral adenopathy.      Lower Body: No right inguinal adenopathy. No left inguinal adenopathy.   Neurological:      Mental Status: She is alert.

## 2024-09-13 LAB
LAB AP GYN PRIMARY INTERPRETATION: NORMAL
Lab: NORMAL

## 2024-12-18 ENCOUNTER — APPOINTMENT (OUTPATIENT)
Dept: LAB | Facility: MEDICAL CENTER | Age: 79
End: 2024-12-18
Payer: MEDICARE

## 2024-12-18 DIAGNOSIS — E78.2 MIXED HYPERLIPIDEMIA: ICD-10-CM

## 2024-12-18 DIAGNOSIS — E55.9 VITAMIN D DEFICIENCY: ICD-10-CM

## 2024-12-18 DIAGNOSIS — E04.1 CYST OF THYROID: ICD-10-CM

## 2024-12-18 DIAGNOSIS — I10 HYPERTENSION, UNSPECIFIED TYPE: ICD-10-CM

## 2024-12-18 LAB
25(OH)D3 SERPL-MCNC: 65.3 NG/ML (ref 30–100)
ALBUMIN SERPL BCG-MCNC: 4.5 G/DL (ref 3.5–5)
ALP SERPL-CCNC: 63 U/L (ref 34–104)
ALT SERPL W P-5'-P-CCNC: 13 U/L (ref 7–52)
ANION GAP SERPL CALCULATED.3IONS-SCNC: 10 MMOL/L (ref 4–13)
AST SERPL W P-5'-P-CCNC: 27 U/L (ref 13–39)
BASOPHILS # BLD AUTO: 0.02 THOUSANDS/ÂΜL (ref 0–0.1)
BASOPHILS NFR BLD AUTO: 0 % (ref 0–1)
BILIRUB SERPL-MCNC: 0.53 MG/DL (ref 0.2–1)
BUN SERPL-MCNC: 30 MG/DL (ref 5–25)
CALCIUM SERPL-MCNC: 10.2 MG/DL (ref 8.4–10.2)
CHLORIDE SERPL-SCNC: 102 MMOL/L (ref 96–108)
CHOLEST SERPL-MCNC: 282 MG/DL (ref ?–200)
CO2 SERPL-SCNC: 30 MMOL/L (ref 21–32)
CREAT SERPL-MCNC: 0.92 MG/DL (ref 0.6–1.3)
CREAT UR-MCNC: 163.9 MG/DL
EOSINOPHIL # BLD AUTO: 0.29 THOUSAND/ÂΜL (ref 0–0.61)
EOSINOPHIL NFR BLD AUTO: 6 % (ref 0–6)
ERYTHROCYTE [DISTWIDTH] IN BLOOD BY AUTOMATED COUNT: 13.7 % (ref 11.6–15.1)
GFR SERPL CREATININE-BSD FRML MDRD: 58 ML/MIN/1.73SQ M
GLUCOSE P FAST SERPL-MCNC: 94 MG/DL (ref 65–99)
HCT VFR BLD AUTO: 43.6 % (ref 34.8–46.1)
HDLC SERPL-MCNC: 93 MG/DL
HGB BLD-MCNC: 14.2 G/DL (ref 11.5–15.4)
IMM GRANULOCYTES # BLD AUTO: 0.01 THOUSAND/UL (ref 0–0.2)
IMM GRANULOCYTES NFR BLD AUTO: 0 % (ref 0–2)
LDLC SERPL CALC-MCNC: 177 MG/DL (ref 0–100)
LYMPHOCYTES # BLD AUTO: 1.65 THOUSANDS/ÂΜL (ref 0.6–4.47)
LYMPHOCYTES NFR BLD AUTO: 34 % (ref 14–44)
MCH RBC QN AUTO: 30.7 PG (ref 26.8–34.3)
MCHC RBC AUTO-ENTMCNC: 32.6 G/DL (ref 31.4–37.4)
MCV RBC AUTO: 94 FL (ref 82–98)
MICROALBUMIN UR-MCNC: 12 MG/L
MICROALBUMIN/CREAT 24H UR: 7 MG/G CREATININE (ref 0–30)
MONOCYTES # BLD AUTO: 0.5 THOUSAND/ÂΜL (ref 0.17–1.22)
MONOCYTES NFR BLD AUTO: 10 % (ref 4–12)
NEUTROPHILS # BLD AUTO: 2.4 THOUSANDS/ÂΜL (ref 1.85–7.62)
NEUTS SEG NFR BLD AUTO: 50 % (ref 43–75)
NONHDLC SERPL-MCNC: 189 MG/DL
NRBC BLD AUTO-RTO: 0 /100 WBCS
PLATELET # BLD AUTO: 300 THOUSANDS/UL (ref 149–390)
PMV BLD AUTO: 11.4 FL (ref 8.9–12.7)
POTASSIUM SERPL-SCNC: 4.1 MMOL/L (ref 3.5–5.3)
PROT SERPL-MCNC: 7.4 G/DL (ref 6.4–8.4)
RBC # BLD AUTO: 4.62 MILLION/UL (ref 3.81–5.12)
SODIUM SERPL-SCNC: 142 MMOL/L (ref 135–147)
TRIGL SERPL-MCNC: 61 MG/DL (ref ?–150)
TSH SERPL DL<=0.05 MIU/L-ACNC: 2.52 UIU/ML (ref 0.45–4.5)
WBC # BLD AUTO: 4.87 THOUSAND/UL (ref 4.31–10.16)

## 2024-12-18 PROCEDURE — 82306 VITAMIN D 25 HYDROXY: CPT

## 2024-12-18 PROCEDURE — 85025 COMPLETE CBC W/AUTO DIFF WBC: CPT

## 2024-12-18 PROCEDURE — 80053 COMPREHEN METABOLIC PANEL: CPT

## 2024-12-18 PROCEDURE — 82043 UR ALBUMIN QUANTITATIVE: CPT

## 2024-12-18 PROCEDURE — 80061 LIPID PANEL: CPT

## 2024-12-18 PROCEDURE — 82570 ASSAY OF URINE CREATININE: CPT

## 2024-12-18 PROCEDURE — 84443 ASSAY THYROID STIM HORMONE: CPT

## 2024-12-18 PROCEDURE — 36415 COLL VENOUS BLD VENIPUNCTURE: CPT

## 2025-02-14 ENCOUNTER — OFFICE VISIT (OUTPATIENT)
Dept: GYNECOLOGY | Facility: CLINIC | Age: 80
End: 2025-02-14
Payer: MEDICARE

## 2025-02-14 VITALS
DIASTOLIC BLOOD PRESSURE: 70 MMHG | HEART RATE: 55 BPM | HEIGHT: 68 IN | SYSTOLIC BLOOD PRESSURE: 132 MMHG | BODY MASS INDEX: 25.54 KG/M2

## 2025-02-14 DIAGNOSIS — N95.2 ATROPHIC VAGINITIS: ICD-10-CM

## 2025-02-14 DIAGNOSIS — N95.0 PMB (POSTMENOPAUSAL BLEEDING): Primary | ICD-10-CM

## 2025-02-14 PROCEDURE — 99213 OFFICE O/P EST LOW 20 MIN: CPT | Performed by: OBSTETRICS & GYNECOLOGY

## 2025-02-14 NOTE — PROGRESS NOTES
"Name: Marcy Todd      : 1945      MRN: [id number]  Encounter Provider: Nguyễn Downing DO  Encounter Date: 2025   Encounter department: [facility]  :  Assessment & Plan  PMB (postmenopausal bleeding)  RTO TVS SIS possible biopsy       Atrophic vaginitis             History of Present Illness   HPI  Marcy Todd is a 79 y.o. female who presents C/O vaginal bleeding over past week. Described as dark brown,No urinary or GI complaints. Using vaginal estrogen for atrophy      Review of Systems       Objective   /70 (BP Location: Left arm, Patient Position: Sitting, Cuff Size: Large)   Pulse 55   Ht 5' 8\" (1.727 m)   BMI 25.54 kg/m²      Physical Exam  Vitals reviewed.   Abdominal:      Palpations: Abdomen is soft.      Hernia: There is no hernia in the left inguinal area or right inguinal area.   Genitourinary:     General: Normal vulva.      Labia:         Right: No rash, tenderness or lesion.         Left: No rash, tenderness or lesion.       Vagina: Normal.      Cervix: Normal.      Uterus: Normal.       Adnexa:         Right: No mass, tenderness or fullness.          Left: No mass, tenderness or fullness.        Comments: Dark blood in vagina  Lymphadenopathy:      Lower Body: No right inguinal adenopathy. No left inguinal adenopathy.   Neurological:      Mental Status: She is alert.           "

## 2025-04-08 NOTE — PROGRESS NOTES
AMB US Pelvic Non OB    Date/Time: 4/9/2025 3:30 PM    Performed by: Joi Dunn  Authorized by: Nguyễn Downing DO  Universal Protocol:  Consent: Verbal consent obtained.  Consent given by: patient  Timeout called at: 4/9/2025 3:39 PM.  Patient understanding: patient states understanding of the procedure being performed  Patient identity confirmed: verbally with patient    Procedure details:     SIS Procedure: Yes    Indications: non-obstetric vaginal bleeding      Technique:  Transvaginal US, Non-OB    Position: lithotomy exam    Uterine findings:     Length (cm): 8.34    Height (cm):  6.36    Width (cm):  5.84    Endometrial stripe: identified      Endometrium thickness (mm):  7.79  Left ovary findings:     Left ovary:  Unable to visualize  Right ovary findings:     Right ovary:  Unable to visualize  Other findings:     Free pelvic fluid: not identified      Free peritoneal fluid: not identified    Post-Procedure Details:     Impression:  Retroverted uterus demonstrates multiple fibroids with calcifications. Largest are right subserosal 3.1cm, posterior subserosal 2.1cm, another posterior subserosal 2.1cm, and at least two anterior intramural with calcifications 1.6cm and 1.7cm. There are other additional fibroids also noted throughout the uterus. The bilateral ovaries are not identified. There is excessive fecal material and bowel gas obscuring the adnexal regions. Multiple nabothian cysts are noted in the cervix.No free fluid.    Tolerance:  Tolerated well, no immediate complications    Complications: no complications    Additional Procedure Comments:      GE Voluson P8 transvaginal transducer RIC5-RA with Serial Number [id number] was used during procedure and subsequently cleaned with high level disinfection utilizing the [employer] EPR Probe .     Ultrasound performed at:     [facility]  [address] [place] [address]  [address] [place] [address]  Phone:  [phone]  Fax:  [phone]      Sonohysterogram    Date/Time: 4/9/2025 3:30 PM    Performed by: Nguyễn Downing DO  Authorized by: Nguyễn Downing DO  Universal Protocol:  Consent: Verbal consent obtained.  Consent given by: patient  Patient understanding: patient states understanding of the procedure being performed  Patient consent: the patient's understanding of the procedure matches consent given  Patient identity confirmed: verbally with patient    Pre-procedure:     Prepped with: Betadine    Procedure:     Cervix cleaned and prepped: yes      Uterus sounded: yes      Catheter inserted: yes      Uterine cavity distended with saline: yes    Post-procedure:     Patient observed: yes      Post procedure instructions given to patient: yes      Patient tolerated procedure well with no complications: yes    Comments:      Sonohysterogram demonstrates an irregular endometrium without distinct polyps.   Endometrial biopsy    Date/Time: 4/9/2025 3:30 PM    Performed by: Nguyễn Downing DO  Authorized by: Nguyễn Downing DO  Universal Protocol:  Consent: Verbal consent obtained.  Patient understanding: patient states understanding of the procedure being performed  Patient identity confirmed: verbally with patient    Indication:     Indications: Post-menopausal bleeding    Procedure:     Procedure: endometrial biopsy with Pipelle      A bivalve speculum was placed in the vagina: yes      Specimen collected: specimen collected and sent to pathology      Patient tolerated procedure well with no complications: yes

## 2025-04-09 ENCOUNTER — ULTRASOUND (OUTPATIENT)
Dept: GYNECOLOGY | Facility: CLINIC | Age: 80
End: 2025-04-09
Payer: MEDICARE

## 2025-04-09 ENCOUNTER — OFFICE VISIT (OUTPATIENT)
Dept: GYNECOLOGY | Facility: CLINIC | Age: 80
End: 2025-04-09
Payer: MEDICARE

## 2025-04-09 DIAGNOSIS — N95.2 ATROPHY OF VAGINA: ICD-10-CM

## 2025-04-09 DIAGNOSIS — N95.0 PMB (POSTMENOPAUSAL BLEEDING): Primary | ICD-10-CM

## 2025-04-09 PROCEDURE — 58100 BIOPSY OF UTERUS LINING: CPT | Performed by: OBSTETRICS & GYNECOLOGY

## 2025-04-09 PROCEDURE — 58340 CATHETER FOR HYSTEROGRAPHY: CPT | Performed by: OBSTETRICS & GYNECOLOGY

## 2025-04-09 PROCEDURE — 88305 TISSUE EXAM BY PATHOLOGIST: CPT | Performed by: PATHOLOGY

## 2025-04-09 PROCEDURE — 99213 OFFICE O/P EST LOW 20 MIN: CPT | Performed by: OBSTETRICS & GYNECOLOGY

## 2025-04-09 PROCEDURE — 76831 ECHO EXAM UTERUS: CPT | Performed by: OBSTETRICS & GYNECOLOGY

## 2025-04-09 RX ORDER — ESTRADIOL 0.1 MG/G
CREAM VAGINAL
Qty: 42.5 G | Refills: 5 | Status: SHIPPED | OUTPATIENT
Start: 2025-04-09

## 2025-04-09 NOTE — ASSESSMENT & PLAN NOTE
Reviewed ultrasound findings with patient.  Biopsy results pending.  Any further bleeding patient will return to the office

## 2025-04-09 NOTE — PROGRESS NOTES
Name: Marcy Todd      : 1945      MRN: [id number]  Encounter Provider: Nguyễn Downing DO  Encounter Date: 2025   Encounter department: [facility]  :  Assessment & Plan  PMB (postmenopausal bleeding)  Reviewed ultrasound findings with patient.  Biopsy results pending.  Any further bleeding patient will return to the office       Atrophy of vagina    Orders:    estradiol (ESTRACE) 0.1 mg/g vaginal cream; INSERT 1 GRAM VAGINALLY TWICE A WEEK AS MAINTENCE DOSE        History of Present Illness   HPI  Marcy Todd is a 80 y.o. female who presents for TVS possible biopsy possible saline infusion secondary to postmenopausal bleeding.  Patient was seen in the office on  stating that she had a dark brown episode of bleeding over the prior week.  She is presently on vaginal estrogen for atrophy.  Denies any pelvic or abdominal pain.  No urinary complaints patient is status post hysteroscopy D&C for postmenopausal bleeding in 2023.  Since that visit on the  she has had no further dark brown discharge or any vaginal bleeding.      Review of Systems       Objective   There were no vitals taken for this visit.     Physical Exam  Procedure Orders:   1. AMB US Pelvic Non OB [[id number]] ordered by Joi Dunn at 25 0810   2. Sonohysterogram [[id number]] ordered by Joi Dunn at 25 1540   3. Endometrial biopsy [[id number]] ordered by Joi Dunn at 25 1540     Post-procedure Diagnoses   1. PMB (postmenopausal bleeding) [N95.0]     AMB US Pelvic Non OB     Date/Time: 2025 3:30 PM     Performed by: Joi Dunn  Authorized by: Nguyễn Downing DO  Universal Protocol:  Consent: Verbal consent obtained.  Consent given by: patient  Timeout called at: 2025 3:39 PM.  Patient understanding: patient states understanding of the procedure being performed  Patient identity confirmed: verbally with patient      Procedure details:     SIS Procedure: Yes    Indications: non-obstetric vaginal bleeding      Technique:  Transvaginal US, Non-OB    Position: lithotomy exam    Uterine findings:     Length (cm): 8.34    Height (cm):  6.36    Width (cm):  5.84    Endometrial stripe: identified      Endometrium thickness (mm):  7.79  Left ovary findings:     Left ovary:  Unable to visualize  Right ovary findings:     Right ovary:  Unable to visualize  Other findings:     Free pelvic fluid: not identified      Free peritoneal fluid: not identified    Post-Procedure Details:     Impression:  Retroverted uterus demonstrates multiple fibroids with calcifications. Largest are right subserosal 3.1cm, posterior subserosal 2.1cm, another posterior subserosal 2.1cm, and at least two anterior intramural with calcifications 1.6cm and 1.7cm. There are other additional fibroids also noted throughout the uterus. The bilateral ovaries are not identified. There is excessive fecal material and bowel gas obscuring the adnexal regions. Multiple nabothian cysts are noted in the cervix.No free fluid.    Tolerance:  Tolerated well, no immediate complications    Complications: no complications    Additional Procedure Comments:      GE Voluson P8 transvaginal transducer RIC5-RA with Serial Number [id number] was used during procedure and subsequently cleaned with high level disinfection utilizing the [employer] EPR Probe .      Ultrasound performed at:      [facility]  [address] [place] [address]  Phone: [phone]  Fax:  [phone]        Sonohysterogram     Date/Time: 4/9/2025 3:30 PM     Performed by: Nguyễn Downing DO  Authorized by: Nguyễn Downing DO  Universal Protocol:  Consent: Verbal consent obtained.  Consent given by: patient  Patient understanding: patient states understanding of the procedure being performed  Patient consent: the patient's understanding of the  procedure matches consent given  Patient identity confirmed: verbally with patient     Pre-procedure:     Prepped with: Betadine    Procedure:     Cervix cleaned and prepped: yes      Uterus sounded: yes      Catheter inserted: yes      Uterine cavity distended with saline: yes    Post-procedure:     Patient observed: yes      Post procedure instructions given to patient: yes      Patient tolerated procedure well with no complications: yes    Comments:      Sonohysterogram demonstrates an irregular endometrium without distinct polyps.   Endometrial biopsy     Date/Time: 4/9/2025 3:30 PM     Performed by: Nguyễn Dwoning DO  Authorized by: Nguyễn Downing DO  Universal Protocol:  Consent: Verbal consent obtained.  Patient understanding: patient states understanding of the procedure being performed  Patient identity confirmed: verbally with patient     Indication:     Indications: Post-menopausal bleeding    Procedure:     Procedure: endometrial biopsy with Pipelle      A bivalve speculum was placed in the vagina: yes      Specimen collected: specimen collected and sent to pathology      Patient tolerated procedure well with no complications: yes

## 2025-04-14 PROCEDURE — 88305 TISSUE EXAM BY PATHOLOGIST: CPT | Performed by: PATHOLOGY

## 2025-08-05 ENCOUNTER — APPOINTMENT (OUTPATIENT)
Dept: LAB | Facility: MEDICAL CENTER | Age: 80
End: 2025-08-05
Attending: PHYSICIAN ASSISTANT
Payer: MEDICARE

## 2025-08-05 DIAGNOSIS — E78.2 MIXED HYPERLIPIDEMIA: ICD-10-CM

## 2025-08-05 DIAGNOSIS — I10 ESSENTIAL HYPERTENSION, MALIGNANT: ICD-10-CM

## 2025-08-05 DIAGNOSIS — E04.8 MEDIASTINAL GOITER: ICD-10-CM

## 2025-08-05 LAB
ALBUMIN SERPL BCG-MCNC: 3.9 G/DL (ref 3.5–5)
ALP SERPL-CCNC: 55 U/L (ref 34–104)
ALT SERPL W P-5'-P-CCNC: 15 U/L (ref 7–52)
ANION GAP SERPL CALCULATED.3IONS-SCNC: 9 MMOL/L (ref 4–13)
AST SERPL W P-5'-P-CCNC: 26 U/L (ref 13–39)
BASOPHILS # BLD AUTO: 0.02 THOUSANDS/ÂΜL (ref 0–0.1)
BASOPHILS NFR BLD AUTO: 0 % (ref 0–1)
BILIRUB SERPL-MCNC: 0.64 MG/DL (ref 0.2–1)
BUN SERPL-MCNC: 20 MG/DL (ref 5–25)
CALCIUM SERPL-MCNC: 9.8 MG/DL (ref 8.4–10.2)
CHLORIDE SERPL-SCNC: 101 MMOL/L (ref 96–108)
CHOLEST SERPL-MCNC: 249 MG/DL (ref ?–200)
CO2 SERPL-SCNC: 32 MMOL/L (ref 21–32)
CREAT SERPL-MCNC: 0.98 MG/DL (ref 0.6–1.3)
CREAT UR-MCNC: 118.1 MG/DL
EOSINOPHIL # BLD AUTO: 0.17 THOUSAND/ÂΜL (ref 0–0.61)
EOSINOPHIL NFR BLD AUTO: 4 % (ref 0–6)
ERYTHROCYTE [DISTWIDTH] IN BLOOD BY AUTOMATED COUNT: 13.7 % (ref 11.6–15.1)
GFR SERPL CREATININE-BSD FRML MDRD: 54 ML/MIN/1.73SQ M
GLUCOSE P FAST SERPL-MCNC: 80 MG/DL (ref 65–99)
HCT VFR BLD AUTO: 39.9 % (ref 34.8–46.1)
HDLC SERPL-MCNC: 98 MG/DL
HGB BLD-MCNC: 13.3 G/DL (ref 11.5–15.4)
IMM GRANULOCYTES # BLD AUTO: 0.01 THOUSAND/UL (ref 0–0.2)
IMM GRANULOCYTES NFR BLD AUTO: 0 % (ref 0–2)
LDLC SERPL CALC-MCNC: 137 MG/DL (ref 0–100)
LYMPHOCYTES # BLD AUTO: 1.11 THOUSANDS/ÂΜL (ref 0.6–4.47)
LYMPHOCYTES NFR BLD AUTO: 25 % (ref 14–44)
MCH RBC QN AUTO: 30.9 PG (ref 26.8–34.3)
MCHC RBC AUTO-ENTMCNC: 33.3 G/DL (ref 31.4–37.4)
MCV RBC AUTO: 93 FL (ref 82–98)
MICROALBUMIN UR-MCNC: 9 MG/L
MICROALBUMIN/CREAT 24H UR: 8 MG/G CREATININE (ref 0–30)
MONOCYTES # BLD AUTO: 0.51 THOUSAND/ÂΜL (ref 0.17–1.22)
MONOCYTES NFR BLD AUTO: 11 % (ref 4–12)
NEUTROPHILS # BLD AUTO: 2.68 THOUSANDS/ÂΜL (ref 1.85–7.62)
NEUTS SEG NFR BLD AUTO: 60 % (ref 43–75)
NONHDLC SERPL-MCNC: 151 MG/DL
NRBC BLD AUTO-RTO: 0 /100 WBCS
PLATELET # BLD AUTO: 270 THOUSANDS/UL (ref 149–390)
PMV BLD AUTO: 11.3 FL (ref 8.9–12.7)
POTASSIUM SERPL-SCNC: 4.3 MMOL/L (ref 3.5–5.3)
PROT SERPL-MCNC: 6.7 G/DL (ref 6.4–8.4)
RBC # BLD AUTO: 4.3 MILLION/UL (ref 3.81–5.12)
SODIUM SERPL-SCNC: 142 MMOL/L (ref 135–147)
TRIGL SERPL-MCNC: 70 MG/DL (ref ?–150)
TSH SERPL DL<=0.05 MIU/L-ACNC: 1.48 UIU/ML (ref 0.45–4.5)
WBC # BLD AUTO: 4.5 THOUSAND/UL (ref 4.31–10.16)

## 2025-08-05 PROCEDURE — 85025 COMPLETE CBC W/AUTO DIFF WBC: CPT

## 2025-08-05 PROCEDURE — 80061 LIPID PANEL: CPT

## 2025-08-05 PROCEDURE — 84443 ASSAY THYROID STIM HORMONE: CPT

## 2025-08-05 PROCEDURE — 82570 ASSAY OF URINE CREATININE: CPT

## 2025-08-05 PROCEDURE — 82043 UR ALBUMIN QUANTITATIVE: CPT

## 2025-08-05 PROCEDURE — 36415 COLL VENOUS BLD VENIPUNCTURE: CPT

## 2025-08-05 PROCEDURE — 80053 COMPREHEN METABOLIC PANEL: CPT

## 2025-08-21 ENCOUNTER — PREP FOR PROCEDURE (OUTPATIENT)
Age: 80
End: 2025-08-21

## 2025-08-21 ENCOUNTER — OFFICE VISIT (OUTPATIENT)
Age: 80
End: 2025-08-21
Payer: MEDICARE

## 2025-08-21 VITALS — HEIGHT: 68 IN | BODY MASS INDEX: 25.54 KG/M2

## 2025-08-21 DIAGNOSIS — M17.12 PRIMARY OSTEOARTHRITIS OF LEFT KNEE: ICD-10-CM

## 2025-08-21 DIAGNOSIS — Z01.818 PREOP TESTING: Primary | ICD-10-CM

## 2025-08-21 DIAGNOSIS — M25.9 JOINT DISEASE: ICD-10-CM

## 2025-08-21 DIAGNOSIS — M17.12 PRIMARY OSTEOARTHRITIS OF LEFT KNEE: Primary | ICD-10-CM

## 2025-08-21 DIAGNOSIS — T14.8XXA JOINT INJURY: ICD-10-CM

## 2025-08-21 DIAGNOSIS — M25.59 PAIN IN OTHER JOINT: ICD-10-CM

## 2025-08-21 PROCEDURE — 99204 OFFICE O/P NEW MOD 45 MIN: CPT | Performed by: ORTHOPAEDIC SURGERY

## 2025-08-21 RX ORDER — CHLORHEXIDINE GLUCONATE 40 MG/ML
SOLUTION TOPICAL DAILY PRN
OUTPATIENT
Start: 2025-08-21

## 2025-08-21 RX ORDER — SODIUM CHLORIDE, SODIUM LACTATE, POTASSIUM CHLORIDE, CALCIUM CHLORIDE 600; 310; 30; 20 MG/100ML; MG/100ML; MG/100ML; MG/100ML
125 INJECTION, SOLUTION INTRAVENOUS CONTINUOUS
OUTPATIENT
Start: 2025-08-21

## 2025-08-21 RX ORDER — ACETAMINOPHEN 325 MG/1
975 TABLET ORAL ONCE
OUTPATIENT
Start: 2025-08-21 | End: 2025-08-21

## 2025-08-21 RX ORDER — CHLORHEXIDINE GLUCONATE ORAL RINSE 1.2 MG/ML
15 SOLUTION DENTAL ONCE
OUTPATIENT
Start: 2025-08-21 | End: 2025-08-21

## (undated) DEVICE — PVC URETHRAL CATHETER: Brand: DOVER

## (undated) DEVICE — PREMIUM DRY TRAY LF: Brand: MEDLINE INDUSTRIES, INC.

## (undated) DEVICE — GLOVE PI ULTRA TOUCH SZ.7.5

## (undated) DEVICE — CYSTO TUBING SINGLE IRRIGATION

## (undated) DEVICE — STRL ALLENTOWN HYSTEROSCOPY PK: Brand: CARDINAL HEALTH

## (undated) DEVICE — SCD SEQUENTIAL COMPRESSION COMFORT SLEEVE MEDIUM KNEE LENGTH: Brand: KENDALL SCD

## (undated) DEVICE — UNDER BUTTOCKS DRAPE W/FLUID CONTROL POUCH: Brand: CONVERTORS